# Patient Record
Sex: MALE | Race: WHITE | NOT HISPANIC OR LATINO | Employment: OTHER | URBAN - METROPOLITAN AREA
[De-identification: names, ages, dates, MRNs, and addresses within clinical notes are randomized per-mention and may not be internally consistent; named-entity substitution may affect disease eponyms.]

---

## 2018-04-18 ENCOUNTER — HOSPITAL ENCOUNTER (EMERGENCY)
Facility: HOSPITAL | Age: 53
Discharge: HOME | End: 2018-04-18
Attending: SURGERY
Payer: MEDICARE

## 2018-04-18 VITALS
OXYGEN SATURATION: 94 % | HEART RATE: 94 BPM | WEIGHT: 190 LBS | TEMPERATURE: 99 F | RESPIRATION RATE: 20 BRPM | SYSTOLIC BLOOD PRESSURE: 131 MMHG | DIASTOLIC BLOOD PRESSURE: 84 MMHG

## 2018-04-18 DIAGNOSIS — J20.9 SUBACUTE BRONCHITIS WITH ASTHMA: Primary | ICD-10-CM

## 2018-04-18 DIAGNOSIS — J45.909 SUBACUTE BRONCHITIS WITH ASTHMA: Primary | ICD-10-CM

## 2018-04-18 DIAGNOSIS — R06.02 SOB (SHORTNESS OF BREATH): ICD-10-CM

## 2018-04-18 PROCEDURE — 99284 EMERGENCY DEPT VISIT MOD MDM: CPT | Mod: 25

## 2018-04-18 PROCEDURE — 96361 HYDRATE IV INFUSION ADD-ON: CPT

## 2018-04-18 PROCEDURE — 94640 AIRWAY INHALATION TREATMENT: CPT

## 2018-04-18 PROCEDURE — 25000242 PHARM REV CODE 250 ALT 637 W/ HCPCS: Performed by: SURGERY

## 2018-04-18 PROCEDURE — 96374 THER/PROPH/DIAG INJ IV PUSH: CPT

## 2018-04-18 PROCEDURE — 63600175 PHARM REV CODE 636 W HCPCS: Performed by: SURGERY

## 2018-04-18 PROCEDURE — 25000003 PHARM REV CODE 250: Performed by: SURGERY

## 2018-04-18 RX ORDER — IPRATROPIUM BROMIDE AND ALBUTEROL SULFATE 2.5; .5 MG/3ML; MG/3ML
3 SOLUTION RESPIRATORY (INHALATION) ONCE
Status: COMPLETED | OUTPATIENT
Start: 2018-04-18 | End: 2018-04-18

## 2018-04-18 RX ORDER — LEVALBUTEROL 1.25 MG/.5ML
1.25 SOLUTION, CONCENTRATE RESPIRATORY (INHALATION)
Status: COMPLETED | OUTPATIENT
Start: 2018-04-18 | End: 2018-04-18

## 2018-04-18 RX ORDER — ALBUTEROL SULFATE 0.63 MG/3ML
0.63 SOLUTION RESPIRATORY (INHALATION) EVERY 6 HOURS PRN
Qty: 1 BOX | Refills: 5 | Status: SHIPPED | OUTPATIENT
Start: 2018-04-18

## 2018-04-18 RX ORDER — METHYLPREDNISOLONE SOD SUCC 125 MG
125 VIAL (EA) INJECTION
Status: COMPLETED | OUTPATIENT
Start: 2018-04-18 | End: 2018-04-18

## 2018-04-18 RX ORDER — HYDROCODONE BITARTRATE AND ACETAMINOPHEN 10; 325 MG/1; MG/1
1 TABLET ORAL
Status: COMPLETED | OUTPATIENT
Start: 2018-04-18 | End: 2018-04-18

## 2018-04-18 RX ORDER — HYDROCODONE BITARTRATE AND HOMATROPINE METHYLBROMIDE ORAL SOLUTION 5; 1.5 MG/5ML; MG/5ML
5 LIQUID ORAL EVERY 4 HOURS PRN
Qty: 120 ML | Refills: 0 | Status: ON HOLD | OUTPATIENT
Start: 2018-04-18 | End: 2021-12-25

## 2018-04-18 RX ORDER — AZITHROMYCIN 250 MG/1
250 TABLET, FILM COATED ORAL DAILY
Qty: 6 TABLET | Refills: 0 | Status: ON HOLD | OUTPATIENT
Start: 2018-04-18 | End: 2021-12-25

## 2018-04-18 RX ORDER — ALBUTEROL SULFATE 0.63 MG/3ML
0.63 SOLUTION RESPIRATORY (INHALATION) EVERY 6 HOURS PRN
COMMUNITY
End: 2018-04-18

## 2018-04-18 RX ADMIN — IPRATROPIUM BROMIDE AND ALBUTEROL SULFATE 3 ML: .5; 3 SOLUTION RESPIRATORY (INHALATION) at 12:04

## 2018-04-18 RX ADMIN — IPRATROPIUM BROMIDE AND ALBUTEROL SULFATE 3 ML: .5; 3 SOLUTION RESPIRATORY (INHALATION) at 01:04

## 2018-04-18 RX ADMIN — LEVALBUTEROL HYDROCHLORIDE 1.25 MG: 1.25 SOLUTION, CONCENTRATE RESPIRATORY (INHALATION) at 02:04

## 2018-04-18 RX ADMIN — HYDROCODONE BITARTRATE AND ACETAMINOPHEN 1 TABLET: 10; 325 TABLET ORAL at 01:04

## 2018-04-18 RX ADMIN — METHYLPREDNISOLONE SODIUM SUCCINATE 125 MG: 125 INJECTION, POWDER, FOR SOLUTION INTRAMUSCULAR; INTRAVENOUS at 12:04

## 2018-04-18 RX ADMIN — SODIUM CHLORIDE 1000 ML: 0.9 INJECTION, SOLUTION INTRAVENOUS at 12:04

## 2018-04-18 NOTE — ED NOTES
Pt is doing a lot of productive coughing. He verbalize his chest and back is hurting from all the coughing.

## 2018-04-18 NOTE — ED NOTES
Been SOB x 3 days was working in a house doing remodel and it got worst. RA sat is 91%.  Hx of COPD.

## 2018-04-18 NOTE — ED PROVIDER NOTES
Encounter Date: 4/18/2018       History     Chief Complaint   Patient presents with    Shortness of Breath     SOB that started 2 days. HX of COPD. takes albuterol at home     Patient has run out of his nebulizers and has had acute shortness of breath for the last several days he comes in and he cannot catch his breath in mild distress      The history is provided by the patient.   Shortness of Breath   This is a recurrent problem. The average episode lasts 2 hours. The problem occurs frequently.The current episode started 1 to 2 hours ago. The problem has been rapidly worsening. Associated symptoms include cough, sputum production and wheezing. Pertinent negatives include no fever and no chest pain. He has tried beta-agonist inhalers for the symptoms. The treatment provided mild relief. He has had no prior hospitalizations. He has had prior ED visits. He has had no prior ICU admissions. Associated medical issues include asthma.     Review of patient's allergies indicates:  No Known Allergies  Past Medical History:   Diagnosis Date    COPD (chronic obstructive pulmonary disease)      History reviewed. No pertinent surgical history.  History reviewed. No pertinent family history.  Social History   Substance Use Topics    Smoking status: Current Every Day Smoker     Packs/day: 1.00    Smokeless tobacco: Never Used    Alcohol use No     Review of Systems   Constitutional: Negative for fever.   Eyes: Negative.    Respiratory: Positive for cough, sputum production, shortness of breath, wheezing and stridor.    Cardiovascular: Negative for chest pain.   Gastrointestinal: Negative.    Endocrine: Negative.    Genitourinary: Negative.    Musculoskeletal: Negative.    Skin: Negative.    Allergic/Immunologic: Negative.    Neurological: Negative.    Hematological: Negative.    Psychiatric/Behavioral: Negative.        Physical Exam     Initial Vitals [04/18/18 1232]   BP Pulse Resp Temp SpO2   (!) 145/90 107 20 98.5 °F  (36.9 °C) (!) 92 %      MAP       108.33         Physical Exam    Vitals reviewed.  Constitutional: He appears well-developed and well-nourished. He appears distressed.   Moderate distress could hardly speak in full sentences   HENT:   Head: Normocephalic.   Eyes: Conjunctivae are normal.   Neck: Normal range of motion. Neck supple.   Cardiovascular:   Tachycardia   Pulmonary/Chest: He is in respiratory distress. He has wheezes.   Patient is titrated with little air exchange he is sweating and in moderate distress his lips are not cyanotic   Abdominal: Soft.   Musculoskeletal: Normal range of motion.   Neurological: He is alert and oriented to person, place, and time.   Skin: Skin is warm and dry.   Psychiatric: He has a normal mood and affect.         ED Course   Critical Care  Date/Time: 4/18/2018 2:31 PM  Performed by: VIKI MORALES III  Authorized by: VIKI MORALES III   Direct patient critical care time: 30 minutes  Ordering / reviewing critical care time: 10 minutes  Documentation critical care time: 20 minutes  Total critical care time (exclusive of procedural time) : 60 minutes        Labs Reviewed - No data to display          Medical Decision Making:   Initial Assessment:   Acute asthmatic exacerbation in moderate distress  ED Management:  Patient ran out of his nebulizers and comes in in moderate distress.  He cannot move any air.  He is not cyanotic but is using inspiratory muscles to breathe.  He was started on a uni heart treatments duo nebs×3 with mild to moderate relief  his chest x-ray is clear and  he was given IV steroids.  He is given IV fluids he was given additional dose of Xopenex at the time of discharge is room air O2 sats were 98% and he was still coughing but his wheezing is much improved                      Clinical Impression:   The primary encounter diagnosis was Subacute bronchitis with asthma. A diagnosis of SOB (shortness of breath) was also pertinent to this  visit.    Disposition:   Disposition: Discharged  Condition: Stable                        VIKI Tubbs III, MD  04/18/18 4330

## 2018-12-03 ENCOUNTER — TELEPHONE (OUTPATIENT)
Dept: FAMILY MEDICINE | Facility: CLINIC | Age: 53
End: 2018-12-03

## 2018-12-18 ENCOUNTER — TELEPHONE (OUTPATIENT)
Dept: FAMILY MEDICINE | Facility: CLINIC | Age: 53
End: 2018-12-18

## 2018-12-18 NOTE — TELEPHONE ENCOUNTER
----- Message from Yamel Elaine sent at 12/18/2018  8:38 AM CST -----  Contact: [phone]/ self   Pt its requesting an appointment for today . Please advise        Offer dr walker, per dr arboleda! AR

## 2020-12-08 ENCOUNTER — TELEPHONE (OUTPATIENT)
Dept: FAMILY MEDICINE | Facility: CLINIC | Age: 55
End: 2020-12-08

## 2020-12-08 NOTE — TELEPHONE ENCOUNTER
I spoke with cheryl and scheduled the pt to see dr hassan dec 15 - [place]  The pt has medicare and medicaid ( secondary)

## 2020-12-08 NOTE — TELEPHONE ENCOUNTER
----- Message from Skylar Jim sent at 12/8/2020  8:07 AM CST -----  Regarding: NP to establish care  Contact: germain Tiwari/[phone]  Patient is requesting a call back regarding establishing care. Please call him to schedule an appt for knee pain. Thanks   Would the patient rather a call back or a response via [facility]?  call  Best Call Back Number:  germain Tiwari/[phone]  Additional Information: Only Dr. Soriano or Dr. Becerra

## 2021-11-19 ENCOUNTER — HOSPITAL ENCOUNTER (EMERGENCY)
Facility: HOSPITAL | Age: 56
Discharge: HOME | End: 2021-11-19
Attending: EMERGENCY MEDICINE
Payer: MEDICARE

## 2021-11-19 VITALS
WEIGHT: 220 LBS | HEART RATE: 90 BPM | SYSTOLIC BLOOD PRESSURE: 176 MMHG | DIASTOLIC BLOOD PRESSURE: 85 MMHG | TEMPERATURE: 98 F | OXYGEN SATURATION: 99 % | BODY MASS INDEX: 28.23 KG/M2 | RESPIRATION RATE: 18 BRPM | HEIGHT: 74 IN

## 2021-11-19 DIAGNOSIS — F22 PARANOID PSYCHOSIS: Primary | ICD-10-CM

## 2021-11-19 PROCEDURE — 99283 EMERGENCY DEPT VISIT LOW MDM: CPT | Mod: ER

## 2021-11-19 RX ORDER — ALBENDAZOLE 200 MG/1
400 TABLET, FILM COATED ORAL 2 TIMES DAILY WITH MEALS
Qty: 6 TABLET | Refills: 0 | Status: SHIPPED | OUTPATIENT
Start: 2021-11-19 | End: 2021-11-22

## 2021-12-24 ENCOUNTER — HOSPITAL ENCOUNTER (INPATIENT)
Facility: HOSPITAL | Age: 56
LOS: 3 days | Discharge: LEFT BEFORE DISCHARGE | DRG: 378 | End: 2021-12-28
Attending: EMERGENCY MEDICINE | Admitting: FAMILY MEDICINE
Payer: MEDICARE

## 2021-12-24 DIAGNOSIS — F15.11 HISTORY OF METHAMPHETAMINE ABUSE: ICD-10-CM

## 2021-12-24 DIAGNOSIS — K92.2 GI BLEED: Primary | ICD-10-CM

## 2021-12-24 DIAGNOSIS — R41.82 ALTERED MENTAL STATUS, UNSPECIFIED ALTERED MENTAL STATUS TYPE: ICD-10-CM

## 2021-12-24 DIAGNOSIS — K92.2 LOWER GI BLEED: ICD-10-CM

## 2021-12-24 DIAGNOSIS — B18.2 CHRONIC HEPATITIS C WITHOUT HEPATIC COMA: ICD-10-CM

## 2021-12-24 DIAGNOSIS — K26.4 DUODENAL ULCER WITH HEMORRHAGE: ICD-10-CM

## 2021-12-24 DIAGNOSIS — R10.9 ABDOMINAL PAIN, UNSPECIFIED ABDOMINAL LOCATION: ICD-10-CM

## 2021-12-24 DIAGNOSIS — K92.2 GASTROINTESTINAL HEMORRHAGE, UNSPECIFIED GASTROINTESTINAL HEMORRHAGE TYPE: ICD-10-CM

## 2021-12-24 DIAGNOSIS — F22 EKBOM'S DELUSIONAL PARASITOSIS: ICD-10-CM

## 2021-12-24 DIAGNOSIS — R55 SYNCOPE, UNSPECIFIED SYNCOPE TYPE: ICD-10-CM

## 2021-12-24 DIAGNOSIS — J96.00 ACUTE RESPIRATORY FAILURE, UNSPECIFIED WHETHER WITH HYPOXIA OR HYPERCAPNIA: ICD-10-CM

## 2021-12-24 DIAGNOSIS — K27.9 PUD (PEPTIC ULCER DISEASE): ICD-10-CM

## 2021-12-24 DIAGNOSIS — F31.9 BIPOLAR 1 DISORDER: ICD-10-CM

## 2021-12-24 DIAGNOSIS — T40.1X1S: ICD-10-CM

## 2021-12-24 DIAGNOSIS — A41.9 SEPSIS: ICD-10-CM

## 2021-12-24 DIAGNOSIS — K62.5 RECTAL BLEEDING: ICD-10-CM

## 2021-12-24 DIAGNOSIS — F19.90 IVDU (INTRAVENOUS DRUG USER): ICD-10-CM

## 2021-12-24 LAB
ALBUMIN SERPL BCP-MCNC: 3.3 G/DL (ref 3.5–5.2)
ALP SERPL-CCNC: 88 U/L (ref 38–126)
ALT SERPL W/O P-5'-P-CCNC: 18 U/L (ref 10–44)
ANION GAP SERPL CALC-SCNC: 9 MMOL/L (ref 8–16)
AST SERPL-CCNC: 18 U/L (ref 15–46)
BASOPHILS # BLD AUTO: 0.07 K/UL (ref 0–0.2)
BASOPHILS NFR BLD: 0.4 % (ref 0–1.9)
BILIRUB SERPL-MCNC: 0.2 MG/DL (ref 0.1–1)
CALCIUM SERPL-MCNC: 8.3 MG/DL (ref 8.7–10.5)
CHLORIDE SERPL-SCNC: 107 MMOL/L (ref 95–110)
CO2 SERPL-SCNC: 22 MMOL/L (ref 23–29)
CREAT SERPL-MCNC: 0.75 MG/DL (ref 0.5–1.4)
DIFFERENTIAL METHOD: ABNORMAL
EOSINOPHIL # BLD AUTO: 0.2 K/UL (ref 0–0.5)
EOSINOPHIL NFR BLD: 1 % (ref 0–8)
ERYTHROCYTE [DISTWIDTH] IN BLOOD BY AUTOMATED COUNT: 14.3 % (ref 11.5–14.5)
EST. GFR  (AFRICAN AMERICAN): >60 ML/MIN/1.73 M^2
EST. GFR  (NON AFRICAN AMERICAN): >60 ML/MIN/1.73 M^2
GLUCOSE SERPL-MCNC: 154 MG/DL (ref 70–110)
HCT VFR BLD AUTO: 28.5 % (ref 40–54)
HGB BLD-MCNC: 9 G/DL (ref 14–18)
IMM GRANULOCYTES # BLD AUTO: 0.12 K/UL (ref 0–0.04)
IMM GRANULOCYTES NFR BLD AUTO: 0.6 % (ref 0–0.5)
INR PPP: 1.1 (ref 0.8–1.2)
LYMPHOCYTES # BLD AUTO: 3 K/UL (ref 1–4.8)
LYMPHOCYTES NFR BLD: 15.8 % (ref 18–48)
MCH RBC QN AUTO: 26.9 PG (ref 27–31)
MCHC RBC AUTO-ENTMCNC: 31.6 G/DL (ref 32–36)
MCV RBC AUTO: 85 FL (ref 82–98)
MONOCYTES # BLD AUTO: 1.1 K/UL (ref 0.3–1)
MONOCYTES NFR BLD: 5.8 % (ref 4–15)
NEUTROPHILS # BLD AUTO: 14.3 K/UL (ref 1.8–7.7)
NEUTROPHILS NFR BLD: 76.4 % (ref 38–73)
NRBC BLD-RTO: 0 /100 WBC
PLATELET # BLD AUTO: 309 K/UL (ref 150–450)
PMV BLD AUTO: 11.7 FL (ref 9.2–12.9)
POTASSIUM SERPL-SCNC: 4.3 MMOL/L (ref 3.5–5.1)
PROT SERPL-MCNC: 6.1 G/DL (ref 6–8.4)
PROTHROMBIN TIME: 11.4 SEC (ref 9–12.5)
RBC # BLD AUTO: 3.34 M/UL (ref 4.6–6.2)
SODIUM SERPL-SCNC: 138 MMOL/L (ref 136–145)
UUN UR-MCNC: 40 MG/DL (ref 2–20)
WBC # BLD AUTO: 18.69 K/UL (ref 3.9–12.7)

## 2021-12-24 PROCEDURE — 63600175 PHARM REV CODE 636 W HCPCS: Mod: ER | Performed by: EMERGENCY MEDICINE

## 2021-12-24 PROCEDURE — 96374 THER/PROPH/DIAG INJ IV PUSH: CPT | Mod: ER

## 2021-12-24 PROCEDURE — 25000003 PHARM REV CODE 250: Mod: ER | Performed by: EMERGENCY MEDICINE

## 2021-12-24 PROCEDURE — 85610 PROTHROMBIN TIME: CPT | Mod: ER | Performed by: EMERGENCY MEDICINE

## 2021-12-24 PROCEDURE — 99285 EMERGENCY DEPT VISIT HI MDM: CPT | Mod: 25,ER

## 2021-12-24 PROCEDURE — 85025 COMPLETE CBC W/AUTO DIFF WBC: CPT | Mod: ER | Performed by: EMERGENCY MEDICINE

## 2021-12-24 PROCEDURE — 80053 COMPREHEN METABOLIC PANEL: CPT | Mod: ER | Performed by: EMERGENCY MEDICINE

## 2021-12-24 PROCEDURE — 96375 TX/PRO/DX INJ NEW DRUG ADDON: CPT | Mod: ER

## 2021-12-24 PROCEDURE — C9113 INJ PANTOPRAZOLE SODIUM, VIA: HCPCS | Mod: ER | Performed by: EMERGENCY MEDICINE

## 2021-12-24 RX ORDER — PANTOPRAZOLE SODIUM 40 MG/10ML
40 INJECTION, POWDER, LYOPHILIZED, FOR SOLUTION INTRAVENOUS
Status: COMPLETED | OUTPATIENT
Start: 2021-12-24 | End: 2021-12-24

## 2021-12-24 RX ADMIN — SODIUM CHLORIDE 1000 ML: 0.9 INJECTION, SOLUTION INTRAVENOUS at 11:12

## 2021-12-24 RX ADMIN — PANTOPRAZOLE SODIUM 40 MG: 40 INJECTION, POWDER, LYOPHILIZED, FOR SOLUTION INTRAVENOUS at 11:12

## 2021-12-25 ENCOUNTER — ANESTHESIA EVENT (OUTPATIENT)
Dept: ENDOSCOPY | Facility: HOSPITAL | Age: 56
DRG: 378 | End: 2021-12-25
Payer: MEDICARE

## 2021-12-25 ENCOUNTER — ANESTHESIA (OUTPATIENT)
Dept: ENDOSCOPY | Facility: HOSPITAL | Age: 56
DRG: 378 | End: 2021-12-25
Payer: MEDICARE

## 2021-12-25 PROBLEM — K92.2 GI BLEED: Status: ACTIVE | Noted: 2021-12-25

## 2021-12-25 PROBLEM — D62 ACUTE BLOOD LOSS ANEMIA: Status: ACTIVE | Noted: 2021-12-25

## 2021-12-25 PROBLEM — B18.2 CHRONIC HEPATITIS C: Status: ACTIVE | Noted: 2021-12-25

## 2021-12-25 PROBLEM — F15.11 HISTORY OF METHAMPHETAMINE ABUSE: Status: ACTIVE | Noted: 2021-12-25

## 2021-12-25 PROBLEM — R55 SYNCOPE: Status: ACTIVE | Noted: 2021-12-25

## 2021-12-25 PROBLEM — F31.9 BIPOLAR 1 DISORDER: Status: ACTIVE | Noted: 2021-12-25

## 2021-12-25 PROBLEM — F19.90 IVDU (INTRAVENOUS DRUG USER): Status: ACTIVE | Noted: 2021-12-25

## 2021-12-25 LAB
ABO + RH BLD: NORMAL
ALBUMIN SERPL BCP-MCNC: 3.1 G/DL (ref 3.5–5.2)
ALBUMIN SERPL BCP-MCNC: 3.3 G/DL (ref 3.5–5.2)
ALLENS TEST: ABNORMAL
ALP SERPL-CCNC: 74 U/L (ref 55–135)
ALP SERPL-CCNC: 79 U/L (ref 55–135)
ALT SERPL W/O P-5'-P-CCNC: 14 U/L (ref 10–44)
ALT SERPL W/O P-5'-P-CCNC: 17 U/L (ref 10–44)
AMPHET+METHAMPHET UR QL: ABNORMAL
ANION GAP SERPL CALC-SCNC: 11 MMOL/L (ref 8–16)
ANION GAP SERPL CALC-SCNC: 8 MMOL/L (ref 8–16)
AORTIC ROOT ANNULUS: 3.4 CM
AST SERPL-CCNC: 13 U/L (ref 10–40)
AST SERPL-CCNC: 14 U/L (ref 10–40)
AV INDEX (PROSTH): 0.82
AV MEAN GRADIENT: 5 MMHG
AV PEAK GRADIENT: 8 MMHG
AV VALVE AREA: 3.42 CM2
AV VELOCITY RATIO: 0.84
BARBITURATES UR QL SCN>200 NG/ML: NEGATIVE
BASOPHILS # BLD AUTO: 0.05 K/UL (ref 0–0.2)
BASOPHILS # BLD AUTO: 0.05 K/UL (ref 0–0.2)
BASOPHILS NFR BLD: 0.3 % (ref 0–1.9)
BASOPHILS NFR BLD: 0.3 % (ref 0–1.9)
BENZODIAZ UR QL SCN>200 NG/ML: NEGATIVE
BILIRUB SERPL-MCNC: 0.2 MG/DL (ref 0.1–1)
BILIRUB SERPL-MCNC: 0.3 MG/DL (ref 0.1–1)
BLD GP AB SCN CELLS X3 SERPL QL: NORMAL
BSA FOR ECHO PROCEDURE: 2.21 M2
BUN SERPL-MCNC: 22 MG/DL (ref 6–20)
BUN SERPL-MCNC: 33 MG/DL (ref 6–20)
BZE UR QL SCN: NEGATIVE
CALCIUM SERPL-MCNC: 8.3 MG/DL (ref 8.7–10.5)
CALCIUM SERPL-MCNC: 8.5 MG/DL (ref 8.7–10.5)
CANNABINOIDS UR QL SCN: NEGATIVE
CHLORIDE SERPL-SCNC: 107 MMOL/L (ref 95–110)
CHLORIDE SERPL-SCNC: 108 MMOL/L (ref 95–110)
CK SERPL-CCNC: 51 U/L (ref 20–200)
CO2 SERPL-SCNC: 19 MMOL/L (ref 23–29)
CO2 SERPL-SCNC: 21 MMOL/L (ref 23–29)
CREAT SERPL-MCNC: 0.7 MG/DL (ref 0.5–1.4)
CREAT SERPL-MCNC: 0.8 MG/DL (ref 0.5–1.4)
CREAT UR-MCNC: 81.1 MG/DL (ref 23–375)
CTP QC/QA: YES
CV ECHO LV RWT: 0.4 CM
DELSYS: ABNORMAL
DIFFERENTIAL METHOD: ABNORMAL
DIFFERENTIAL METHOD: ABNORMAL
DOP CALC AO PEAK VEL: 1.43 M/S
DOP CALC AO VTI: 25.78 CM
DOP CALC LVOT AREA: 4.2 CM2
DOP CALC LVOT DIAMETER: 2.3 CM
DOP CALC LVOT PEAK VEL: 1.2 M/S
DOP CALC LVOT STROKE VOLUME: 88.24 CM3
DOP CALC MV VTI: 18.05 CM
DOP CALCLVOT PEAK VEL VTI: 21.25 CM
E WAVE DECELERATION TIME: 254.52 MSEC
E/A RATIO: 1
E/E' RATIO: 5.67 M/S
ECHO LV POSTERIOR WALL: 1 CM (ref 0.6–1.1)
EJECTION FRACTION: 65 %
EOSINOPHIL # BLD AUTO: 0 K/UL (ref 0–0.5)
EOSINOPHIL # BLD AUTO: 0.1 K/UL (ref 0–0.5)
EOSINOPHIL NFR BLD: 0.1 % (ref 0–8)
EOSINOPHIL NFR BLD: 0.6 % (ref 0–8)
ERYTHROCYTE [DISTWIDTH] IN BLOOD BY AUTOMATED COUNT: 14.4 % (ref 11.5–14.5)
ERYTHROCYTE [DISTWIDTH] IN BLOOD BY AUTOMATED COUNT: 14.5 % (ref 11.5–14.5)
ERYTHROCYTE [SEDIMENTATION RATE] IN BLOOD BY WESTERGREN METHOD: 22 MM/H
EST. GFR  (AFRICAN AMERICAN): >60 ML/MIN/1.73 M^2
EST. GFR  (AFRICAN AMERICAN): >60 ML/MIN/1.73 M^2
EST. GFR  (NON AFRICAN AMERICAN): >60 ML/MIN/1.73 M^2
EST. GFR  (NON AFRICAN AMERICAN): >60 ML/MIN/1.73 M^2
ESTIMATED AVG GLUCOSE: 117 MG/DL (ref 68–131)
ETHANOL SERPL-MCNC: <10 MG/DL
FERRITIN SERPL-MCNC: 32 NG/ML (ref 20–300)
FIO2: 40
FOLATE SERPL-MCNC: 7.4 NG/ML (ref 4–24)
FRACTIONAL SHORTENING: 36 % (ref 28–44)
GLUCOSE SERPL-MCNC: 121 MG/DL (ref 70–110)
GLUCOSE SERPL-MCNC: 156 MG/DL (ref 70–110)
HBA1C MFR BLD: 5.7 % (ref 4–5.6)
HCO3 UR-SCNC: 23.7 MMOL/L (ref 24–28)
HCT VFR BLD AUTO: 21.1 % (ref 40–54)
HCT VFR BLD AUTO: 22.6 % (ref 40–54)
HCT VFR BLD AUTO: 22.7 % (ref 40–54)
HCT VFR BLD AUTO: 22.9 % (ref 40–54)
HCT VFR BLD AUTO: 23.2 % (ref 40–54)
HCT VFR BLD AUTO: 26.1 % (ref 40–54)
HCT VFR BLD CALC: 22 %PCV (ref 36–54)
HGB BLD-MCNC: 6.9 G/DL (ref 14–18)
HGB BLD-MCNC: 7.4 G/DL (ref 14–18)
HGB BLD-MCNC: 7.5 G/DL (ref 14–18)
HGB BLD-MCNC: 8 G/DL
HGB BLD-MCNC: 8.4 G/DL (ref 14–18)
IMM GRANULOCYTES # BLD AUTO: 0.06 K/UL (ref 0–0.04)
IMM GRANULOCYTES # BLD AUTO: 0.15 K/UL (ref 0–0.04)
IMM GRANULOCYTES NFR BLD AUTO: 0.4 % (ref 0–0.5)
IMM GRANULOCYTES NFR BLD AUTO: 0.8 % (ref 0–0.5)
INTERVENTRICULAR SEPTUM: 1.13 CM (ref 0.6–1.1)
IRON SERPL-MCNC: 39 UG/DL (ref 45–160)
LA MAJOR: 4.44 CM
LA MINOR: 4.68 CM
LA WIDTH: 3.78 CM
LACTATE SERPL-SCNC: 1.3 MMOL/L (ref 0.5–2.2)
LACTATE SERPL-SCNC: 1.5 MMOL/L (ref 0.5–2.2)
LEFT ATRIUM SIZE: 2.96 CM
LEFT ATRIUM VOLUME INDEX MOD: 15.2 ML/M2
LEFT ATRIUM VOLUME INDEX: 19.7 ML/M2
LEFT ATRIUM VOLUME MOD: 33.48 CM3
LEFT ATRIUM VOLUME: 43.34 CM3
LEFT INTERNAL DIMENSION IN SYSTOLE: 3.2 CM (ref 2.1–4)
LEFT VENTRICLE DIASTOLIC VOLUME INDEX: 54.56 ML/M2
LEFT VENTRICLE DIASTOLIC VOLUME: 120.04 ML
LEFT VENTRICLE MASS INDEX: 91 G/M2
LEFT VENTRICLE SYSTOLIC VOLUME INDEX: 18.7 ML/M2
LEFT VENTRICLE SYSTOLIC VOLUME: 41.05 ML
LEFT VENTRICULAR INTERNAL DIMENSION IN DIASTOLE: 5.03 CM (ref 3.5–6)
LEFT VENTRICULAR MASS: 200.11 G
LIPASE SERPL-CCNC: 13 U/L (ref 4–60)
LV LATERAL E/E' RATIO: 4.86 M/S
LV SEPTAL E/E' RATIO: 6.8 M/S
LYMPHOCYTES # BLD AUTO: 2.9 K/UL (ref 1–4.8)
LYMPHOCYTES # BLD AUTO: 3.1 K/UL (ref 1–4.8)
LYMPHOCYTES NFR BLD: 15.7 % (ref 18–48)
LYMPHOCYTES NFR BLD: 21.8 % (ref 18–48)
MAGNESIUM SERPL-MCNC: 1.8 MG/DL (ref 1.6–2.6)
MAGNESIUM SERPL-MCNC: 2 MG/DL (ref 1.6–2.6)
MCH RBC QN AUTO: 26.6 PG (ref 27–31)
MCH RBC QN AUTO: 26.9 PG (ref 27–31)
MCHC RBC AUTO-ENTMCNC: 32.2 G/DL (ref 32–36)
MCHC RBC AUTO-ENTMCNC: 32.3 G/DL (ref 32–36)
MCV RBC AUTO: 82 FL (ref 82–98)
MCV RBC AUTO: 84 FL (ref 82–98)
METHADONE UR QL SCN>300 NG/ML: NEGATIVE
MIN VOL: 13
MODE: ABNORMAL
MONOCYTES # BLD AUTO: 0.8 K/UL (ref 0.3–1)
MONOCYTES # BLD AUTO: 0.8 K/UL (ref 0.3–1)
MONOCYTES NFR BLD: 4.2 % (ref 4–15)
MONOCYTES NFR BLD: 5.3 % (ref 4–15)
MV MEAN GRADIENT: 0 MMHG
MV PEAK A VEL: 0.68 M/S
MV PEAK E VEL: 0.68 M/S
MV PEAK GRADIENT: 2 MMHG
MV STENOSIS PRESSURE HALF TIME: 73.81 MS
MV VALVE AREA BY CONTINUITY EQUATION: 4.89 CM2
MV VALVE AREA P 1/2 METHOD: 2.98 CM2
NEUTROPHILS # BLD AUTO: 10.3 K/UL (ref 1.8–7.7)
NEUTROPHILS # BLD AUTO: 14.4 K/UL (ref 1.8–7.7)
NEUTROPHILS NFR BLD: 71.6 % (ref 38–73)
NEUTROPHILS NFR BLD: 78.9 % (ref 38–73)
NRBC BLD-RTO: 0 /100 WBC
NRBC BLD-RTO: 0 /100 WBC
OB PNL STL: POSITIVE
OPIATES UR QL SCN: ABNORMAL
PCO2 BLDA: 35.7 MMHG (ref 35–45)
PCP UR QL SCN>25 NG/ML: NEGATIVE
PEEP: 5
PH SMN: 7.43 [PH] (ref 7.35–7.45)
PHOSPHATE SERPL-MCNC: 3.2 MG/DL (ref 2.7–4.5)
PHOSPHATE SERPL-MCNC: 3.4 MG/DL (ref 2.7–4.5)
PIP: 16
PLATELET # BLD AUTO: 280 K/UL (ref 150–450)
PLATELET # BLD AUTO: 289 K/UL (ref 150–450)
PMV BLD AUTO: 11.3 FL (ref 9.2–12.9)
PMV BLD AUTO: 11.7 FL (ref 9.2–12.9)
PO2 BLDA: 138 MMHG (ref 80–100)
POC BE: -1 MMOL/L
POC SATURATED O2: 99 % (ref 95–100)
POC TCO2: 25 MMOL/L (ref 23–27)
POTASSIUM BLD-SCNC: 3.8 MMOL/L (ref 3.5–5.1)
POTASSIUM SERPL-SCNC: 4.2 MMOL/L (ref 3.5–5.1)
POTASSIUM SERPL-SCNC: 4.4 MMOL/L (ref 3.5–5.1)
PROT SERPL-MCNC: 5.7 G/DL (ref 6–8.4)
PROT SERPL-MCNC: 5.8 G/DL (ref 6–8.4)
PV PEAK VELOCITY: 0.91 CM/S
RA MAJOR: 4.5 CM
RA PRESSURE: 3 MMHG
RA WIDTH: 3.45 CM
RBC # BLD AUTO: 2.82 M/UL (ref 4.6–6.2)
RBC # BLD AUTO: 3.12 M/UL (ref 4.6–6.2)
RIGHT VENTRICULAR END-DIASTOLIC DIMENSION: 3.05 CM
RV TISSUE DOPPLER FREE WALL SYSTOLIC VELOCITY 1 (APICAL 4 CHAMBER VIEW): 16.03 CM/S
SAMPLE: ABNORMAL
SARS-COV-2 RDRP RESP QL NAA+PROBE: NEGATIVE
SATURATED IRON: 12 % (ref 20–50)
SITE: ABNORMAL
SODIUM BLD-SCNC: 137 MMOL/L (ref 136–145)
SODIUM SERPL-SCNC: 137 MMOL/L (ref 136–145)
SODIUM SERPL-SCNC: 137 MMOL/L (ref 136–145)
SP02: 100
TDI LATERAL: 0.14 M/S
TDI SEPTAL: 0.1 M/S
TDI: 0.12 M/S
TOTAL IRON BINDING CAPACITY: 323 UG/DL (ref 250–450)
TOXICOLOGY INFORMATION: ABNORMAL
TRANSFERRIN SERPL-MCNC: 218 MG/DL (ref 200–375)
TROPONIN I SERPL DL<=0.01 NG/ML-MCNC: 0.04 NG/ML (ref 0–0.03)
TROPONIN I SERPL DL<=0.01 NG/ML-MCNC: 0.05 NG/ML (ref 0–0.03)
TSH SERPL DL<=0.005 MIU/L-ACNC: 0.43 UIU/ML (ref 0.4–4)
VIT B12 SERPL-MCNC: 432 PG/ML (ref 210–950)
VT: 480
WBC # BLD AUTO: 14.39 K/UL (ref 3.9–12.7)
WBC # BLD AUTO: 18.3 K/UL (ref 3.9–12.7)

## 2021-12-25 PROCEDURE — 87209 SMEAR COMPLEX STAIN: CPT | Performed by: STUDENT IN AN ORGANIZED HEALTH CARE EDUCATION/TRAINING PROGRAM

## 2021-12-25 PROCEDURE — 20000000 HC ICU ROOM

## 2021-12-25 PROCEDURE — 37000008 HC ANESTHESIA 1ST 15 MINUTES: Performed by: INTERNAL MEDICINE

## 2021-12-25 PROCEDURE — 99900035 HC TECH TIME PER 15 MIN (STAT)

## 2021-12-25 PROCEDURE — 27201038 HC PROBE, BI-POLAR: Performed by: INTERNAL MEDICINE

## 2021-12-25 PROCEDURE — C9113 INJ PANTOPRAZOLE SODIUM, VIA: HCPCS | Performed by: STUDENT IN AN ORGANIZED HEALTH CARE EDUCATION/TRAINING PROGRAM

## 2021-12-25 PROCEDURE — 94002 VENT MGMT INPAT INIT DAY: CPT

## 2021-12-25 PROCEDURE — 80307 DRUG TEST PRSMV CHEM ANLYZR: CPT | Mod: ER | Performed by: EMERGENCY MEDICINE

## 2021-12-25 PROCEDURE — 86920 COMPATIBILITY TEST SPIN: CPT | Performed by: STUDENT IN AN ORGANIZED HEALTH CARE EDUCATION/TRAINING PROGRAM

## 2021-12-25 PROCEDURE — 63600175 PHARM REV CODE 636 W HCPCS: Performed by: INTERNAL MEDICINE

## 2021-12-25 PROCEDURE — 27202363 HC INJECTION AGENT, SUBMUCOSAL, ANY: Performed by: INTERNAL MEDICINE

## 2021-12-25 PROCEDURE — 83605 ASSAY OF LACTIC ACID: CPT | Mod: 91 | Performed by: STUDENT IN AN ORGANIZED HEALTH CARE EDUCATION/TRAINING PROGRAM

## 2021-12-25 PROCEDURE — 83690 ASSAY OF LIPASE: CPT | Performed by: STUDENT IN AN ORGANIZED HEALTH CARE EDUCATION/TRAINING PROGRAM

## 2021-12-25 PROCEDURE — 82803 BLOOD GASES ANY COMBINATION: CPT

## 2021-12-25 PROCEDURE — 25000003 PHARM REV CODE 250: Performed by: STUDENT IN AN ORGANIZED HEALTH CARE EDUCATION/TRAINING PROGRAM

## 2021-12-25 PROCEDURE — 80053 COMPREHEN METABOLIC PANEL: CPT | Mod: 91 | Performed by: STUDENT IN AN ORGANIZED HEALTH CARE EDUCATION/TRAINING PROGRAM

## 2021-12-25 PROCEDURE — 84100 ASSAY OF PHOSPHORUS: CPT | Performed by: STUDENT IN AN ORGANIZED HEALTH CARE EDUCATION/TRAINING PROGRAM

## 2021-12-25 PROCEDURE — 36415 COLL VENOUS BLD VENIPUNCTURE: CPT | Performed by: STUDENT IN AN ORGANIZED HEALTH CARE EDUCATION/TRAINING PROGRAM

## 2021-12-25 PROCEDURE — 83540 ASSAY OF IRON: CPT | Performed by: STUDENT IN AN ORGANIZED HEALTH CARE EDUCATION/TRAINING PROGRAM

## 2021-12-25 PROCEDURE — 83735 ASSAY OF MAGNESIUM: CPT | Mod: 91 | Performed by: STUDENT IN AN ORGANIZED HEALTH CARE EDUCATION/TRAINING PROGRAM

## 2021-12-25 PROCEDURE — 82272 OCCULT BLD FECES 1-3 TESTS: CPT | Performed by: STUDENT IN AN ORGANIZED HEALTH CARE EDUCATION/TRAINING PROGRAM

## 2021-12-25 PROCEDURE — 85025 COMPLETE CBC W/AUTO DIFF WBC: CPT | Performed by: STUDENT IN AN ORGANIZED HEALTH CARE EDUCATION/TRAINING PROGRAM

## 2021-12-25 PROCEDURE — 31500 INSERT EMERGENCY AIRWAY: CPT | Mod: GC,,, | Performed by: INTERNAL MEDICINE

## 2021-12-25 PROCEDURE — 84443 ASSAY THYROID STIM HORMONE: CPT | Performed by: STUDENT IN AN ORGANIZED HEALTH CARE EDUCATION/TRAINING PROGRAM

## 2021-12-25 PROCEDURE — 87040 BLOOD CULTURE FOR BACTERIA: CPT | Performed by: STUDENT IN AN ORGANIZED HEALTH CARE EDUCATION/TRAINING PROGRAM

## 2021-12-25 PROCEDURE — 82077 ASSAY SPEC XCP UR&BREATH IA: CPT | Performed by: STUDENT IN AN ORGANIZED HEALTH CARE EDUCATION/TRAINING PROGRAM

## 2021-12-25 PROCEDURE — 80074 ACUTE HEPATITIS PANEL: CPT | Performed by: STUDENT IN AN ORGANIZED HEALTH CARE EDUCATION/TRAINING PROGRAM

## 2021-12-25 PROCEDURE — 25000003 PHARM REV CODE 250

## 2021-12-25 PROCEDURE — 63600175 PHARM REV CODE 636 W HCPCS: Performed by: STUDENT IN AN ORGANIZED HEALTH CARE EDUCATION/TRAINING PROGRAM

## 2021-12-25 PROCEDURE — 82607 VITAMIN B-12: CPT | Performed by: STUDENT IN AN ORGANIZED HEALTH CARE EDUCATION/TRAINING PROGRAM

## 2021-12-25 PROCEDURE — 99291 CRITICAL CARE FIRST HOUR: CPT | Mod: 25,GC,, | Performed by: INTERNAL MEDICINE

## 2021-12-25 PROCEDURE — 36600 WITHDRAWAL OF ARTERIAL BLOOD: CPT

## 2021-12-25 PROCEDURE — 36415 COLL VENOUS BLD VENIPUNCTURE: CPT | Performed by: FAMILY MEDICINE

## 2021-12-25 PROCEDURE — 43236 UPPR GI SCOPE W/SUBMUC INJ: CPT | Performed by: INTERNAL MEDICINE

## 2021-12-25 PROCEDURE — 85018 HEMOGLOBIN: CPT | Mod: 91 | Performed by: STUDENT IN AN ORGANIZED HEALTH CARE EDUCATION/TRAINING PROGRAM

## 2021-12-25 PROCEDURE — 86901 BLOOD TYPING SEROLOGIC RH(D): CPT | Performed by: STUDENT IN AN ORGANIZED HEALTH CARE EDUCATION/TRAINING PROGRAM

## 2021-12-25 PROCEDURE — U0002 COVID-19 LAB TEST NON-CDC: HCPCS | Mod: ER | Performed by: EMERGENCY MEDICINE

## 2021-12-25 PROCEDURE — 84484 ASSAY OF TROPONIN QUANT: CPT | Mod: 91 | Performed by: STUDENT IN AN ORGANIZED HEALTH CARE EDUCATION/TRAINING PROGRAM

## 2021-12-25 PROCEDURE — 85014 HEMATOCRIT: CPT | Mod: 91 | Performed by: STUDENT IN AN ORGANIZED HEALTH CARE EDUCATION/TRAINING PROGRAM

## 2021-12-25 PROCEDURE — 63600175 PHARM REV CODE 636 W HCPCS: Performed by: FAMILY MEDICINE

## 2021-12-25 PROCEDURE — 27000221 HC OXYGEN, UP TO 24 HOURS

## 2021-12-25 PROCEDURE — 83036 HEMOGLOBIN GLYCOSYLATED A1C: CPT | Performed by: STUDENT IN AN ORGANIZED HEALTH CARE EDUCATION/TRAINING PROGRAM

## 2021-12-25 PROCEDURE — 85025 COMPLETE CBC W/AUTO DIFF WBC: CPT | Mod: 91 | Performed by: STUDENT IN AN ORGANIZED HEALTH CARE EDUCATION/TRAINING PROGRAM

## 2021-12-25 PROCEDURE — 82746 ASSAY OF FOLIC ACID SERUM: CPT | Performed by: STUDENT IN AN ORGANIZED HEALTH CARE EDUCATION/TRAINING PROGRAM

## 2021-12-25 PROCEDURE — 87522 HEPATITIS C REVRS TRNSCRPJ: CPT | Performed by: STUDENT IN AN ORGANIZED HEALTH CARE EDUCATION/TRAINING PROGRAM

## 2021-12-25 PROCEDURE — 63600175 PHARM REV CODE 636 W HCPCS

## 2021-12-25 PROCEDURE — 99291 PR CRITICAL CARE, E/M 30-74 MINUTES: ICD-10-PCS | Mod: 25,GC,, | Performed by: INTERNAL MEDICINE

## 2021-12-25 PROCEDURE — 83605 ASSAY OF LACTIC ACID: CPT | Performed by: STUDENT IN AN ORGANIZED HEALTH CARE EDUCATION/TRAINING PROGRAM

## 2021-12-25 PROCEDURE — 43255 EGD CONTROL BLEEDING ANY: CPT | Performed by: INTERNAL MEDICINE

## 2021-12-25 PROCEDURE — 37000009 HC ANESTHESIA EA ADD 15 MINS: Performed by: INTERNAL MEDICINE

## 2021-12-25 PROCEDURE — 82728 ASSAY OF FERRITIN: CPT | Performed by: STUDENT IN AN ORGANIZED HEALTH CARE EDUCATION/TRAINING PROGRAM

## 2021-12-25 PROCEDURE — 84484 ASSAY OF TROPONIN QUANT: CPT | Performed by: STUDENT IN AN ORGANIZED HEALTH CARE EDUCATION/TRAINING PROGRAM

## 2021-12-25 PROCEDURE — 87389 HIV-1 AG W/HIV-1&-2 AB AG IA: CPT | Performed by: STUDENT IN AN ORGANIZED HEALTH CARE EDUCATION/TRAINING PROGRAM

## 2021-12-25 PROCEDURE — 84100 ASSAY OF PHOSPHORUS: CPT | Mod: 91 | Performed by: STUDENT IN AN ORGANIZED HEALTH CARE EDUCATION/TRAINING PROGRAM

## 2021-12-25 PROCEDURE — 31500 PR INSERT, EMERGENCY ENDOTRACH AIRWAY: ICD-10-PCS | Mod: GC,,, | Performed by: INTERNAL MEDICINE

## 2021-12-25 PROCEDURE — 83735 ASSAY OF MAGNESIUM: CPT | Performed by: STUDENT IN AN ORGANIZED HEALTH CARE EDUCATION/TRAINING PROGRAM

## 2021-12-25 PROCEDURE — 25000003 PHARM REV CODE 250: Performed by: FAMILY MEDICINE

## 2021-12-25 PROCEDURE — 80053 COMPREHEN METABOLIC PANEL: CPT | Performed by: STUDENT IN AN ORGANIZED HEALTH CARE EDUCATION/TRAINING PROGRAM

## 2021-12-25 PROCEDURE — 82550 ASSAY OF CK (CPK): CPT | Performed by: STUDENT IN AN ORGANIZED HEALTH CARE EDUCATION/TRAINING PROGRAM

## 2021-12-25 RX ORDER — FENTANYL CITRATE-0.9 % NACL/PF 10 MCG/ML
0-250 PLASTIC BAG, INJECTION (ML) INTRAVENOUS CONTINUOUS
Status: DISCONTINUED | OUTPATIENT
Start: 2021-12-25 | End: 2021-12-28

## 2021-12-25 RX ORDER — SODIUM CHLORIDE 0.9 % (FLUSH) 0.9 %
3 SYRINGE (ML) INJECTION
Status: DISCONTINUED | OUTPATIENT
Start: 2021-12-25 | End: 2021-12-28 | Stop reason: HOSPADM

## 2021-12-25 RX ORDER — IBUPROFEN 200 MG
16 TABLET ORAL
Status: DISCONTINUED | OUTPATIENT
Start: 2021-12-25 | End: 2021-12-28 | Stop reason: HOSPADM

## 2021-12-25 RX ORDER — BUPRENORPHINE HYDROCHLORIDE AND NALOXONE HYDROCHLORIDE DIHYDRATE 8; 2 MG/1; MG/1
8 TABLET SUBLINGUAL DAILY
Status: DISCONTINUED | OUTPATIENT
Start: 2021-12-25 | End: 2021-12-26

## 2021-12-25 RX ORDER — PROPOFOL 10 MG/ML
INJECTION, EMULSION INTRAVENOUS
Status: DISPENSED
Start: 2021-12-25 | End: 2021-12-26

## 2021-12-25 RX ORDER — KETAMINE HCL IN 0.9 % NACL 50 MG/5 ML
SYRINGE (ML) INTRAVENOUS
Status: DISCONTINUED | OUTPATIENT
Start: 2021-12-25 | End: 2021-12-25

## 2021-12-25 RX ORDER — SODIUM CHLORIDE, SODIUM LACTATE, POTASSIUM CHLORIDE, CALCIUM CHLORIDE 600; 310; 30; 20 MG/100ML; MG/100ML; MG/100ML; MG/100ML
INJECTION, SOLUTION INTRAVENOUS CONTINUOUS
Status: DISCONTINUED | OUTPATIENT
Start: 2021-12-25 | End: 2021-12-25

## 2021-12-25 RX ORDER — HYDROCODONE BITARTRATE AND ACETAMINOPHEN 7.5; 3 MG/1; MG/1
1 TABLET ORAL ONCE
COMMUNITY
End: 2023-05-02

## 2021-12-25 RX ORDER — PROPOFOL 10 MG/ML
VIAL (ML) INTRAVENOUS CONTINUOUS PRN
Status: DISCONTINUED | OUTPATIENT
Start: 2021-12-25 | End: 2021-12-25

## 2021-12-25 RX ORDER — SODIUM CHLORIDE 9 MG/ML
INJECTION, SOLUTION INTRAVENOUS CONTINUOUS
Status: DISCONTINUED | OUTPATIENT
Start: 2021-12-25 | End: 2021-12-28 | Stop reason: HOSPADM

## 2021-12-25 RX ORDER — GLUCAGON 1 MG
1 KIT INJECTION
Status: DISCONTINUED | OUTPATIENT
Start: 2021-12-25 | End: 2021-12-28 | Stop reason: HOSPADM

## 2021-12-25 RX ORDER — LORAZEPAM 2 MG/ML
INJECTION INTRAMUSCULAR
Status: DISPENSED
Start: 2021-12-25 | End: 2021-12-26

## 2021-12-25 RX ORDER — MIDAZOLAM HYDROCHLORIDE 1 MG/ML
INJECTION, SOLUTION INTRAMUSCULAR; INTRAVENOUS
Status: DISCONTINUED | OUTPATIENT
Start: 2021-12-25 | End: 2021-12-25

## 2021-12-25 RX ORDER — METRONIDAZOLE 500 MG/100ML
500 INJECTION, SOLUTION INTRAVENOUS
Status: DISCONTINUED | OUTPATIENT
Start: 2021-12-25 | End: 2021-12-25

## 2021-12-25 RX ORDER — BUPRENORPHINE HYDROCHLORIDE AND NALOXONE HYDROCHLORIDE DIHYDRATE 8; 2 MG/1; MG/1
8 TABLET SUBLINGUAL ONCE
Status: COMPLETED | OUTPATIENT
Start: 2021-12-25 | End: 2021-12-25

## 2021-12-25 RX ORDER — SODIUM CHLORIDE 0.9 % (FLUSH) 0.9 %
.1-1 SYRINGE (ML) INJECTION
Status: DISCONTINUED | OUTPATIENT
Start: 2021-12-25 | End: 2021-12-28 | Stop reason: HOSPADM

## 2021-12-25 RX ORDER — ONDANSETRON 2 MG/ML
4 INJECTION INTRAMUSCULAR; INTRAVENOUS EVERY 8 HOURS PRN
Status: DISCONTINUED | OUTPATIENT
Start: 2021-12-25 | End: 2021-12-28 | Stop reason: HOSPADM

## 2021-12-25 RX ORDER — ALBENDAZOLE 200 MG/1
400 TABLET, FILM COATED ORAL 2 TIMES DAILY
Status: ON HOLD | COMMUNITY
Start: 2021-11-22 | End: 2021-12-28

## 2021-12-25 RX ORDER — PROPOFOL 10 MG/ML
VIAL (ML) INTRAVENOUS
Status: DISCONTINUED | OUTPATIENT
Start: 2021-12-25 | End: 2021-12-25

## 2021-12-25 RX ORDER — SODIUM CHLORIDE 0.9 % (FLUSH) 0.9 %
5 SYRINGE (ML) INJECTION
Status: DISCONTINUED | OUTPATIENT
Start: 2021-12-25 | End: 2021-12-28 | Stop reason: HOSPADM

## 2021-12-25 RX ORDER — BUPRENORPHINE HYDROCHLORIDE AND NALOXONE HYDROCHLORIDE DIHYDRATE 8; 2 MG/1; MG/1
8 TABLET SUBLINGUAL ONCE
Status: DISCONTINUED | OUTPATIENT
Start: 2021-12-25 | End: 2021-12-25

## 2021-12-25 RX ORDER — DEXMEDETOMIDINE HYDROCHLORIDE 4 UG/ML
INJECTION, SOLUTION INTRAVENOUS
Status: DISPENSED
Start: 2021-12-25 | End: 2021-12-26

## 2021-12-25 RX ORDER — ETOMIDATE 2 MG/ML
INJECTION INTRAVENOUS
Status: COMPLETED
Start: 2021-12-25 | End: 2021-12-25

## 2021-12-25 RX ORDER — DEXMEDETOMIDINE HYDROCHLORIDE 4 UG/ML
0-1.4 INJECTION, SOLUTION INTRAVENOUS CONTINUOUS
Status: DISCONTINUED | OUTPATIENT
Start: 2021-12-25 | End: 2021-12-28 | Stop reason: HOSPADM

## 2021-12-25 RX ORDER — CLONIDINE HYDROCHLORIDE 0.1 MG/1
0.1 TABLET ORAL ONCE
Status: COMPLETED | OUTPATIENT
Start: 2021-12-25 | End: 2021-12-25

## 2021-12-25 RX ORDER — ROCURONIUM BROMIDE 10 MG/ML
INJECTION, SOLUTION INTRAVENOUS
Status: COMPLETED
Start: 2021-12-25 | End: 2021-12-25

## 2021-12-25 RX ORDER — HYDROCODONE BITARTRATE AND ACETAMINOPHEN 500; 5 MG/1; MG/1
TABLET ORAL
Status: DISCONTINUED | OUTPATIENT
Start: 2021-12-25 | End: 2021-12-28 | Stop reason: HOSPADM

## 2021-12-25 RX ORDER — HALOPERIDOL 5 MG/ML
INJECTION INTRAMUSCULAR
Status: COMPLETED
Start: 2021-12-25 | End: 2021-12-25

## 2021-12-25 RX ORDER — IBUPROFEN 200 MG
24 TABLET ORAL
Status: DISCONTINUED | OUTPATIENT
Start: 2021-12-25 | End: 2021-12-28 | Stop reason: HOSPADM

## 2021-12-25 RX ORDER — EPINEPHRINE 0.1 MG/ML
INJECTION INTRAVENOUS
Status: COMPLETED | OUTPATIENT
Start: 2021-12-25 | End: 2021-12-25

## 2021-12-25 RX ORDER — SODIUM CHLORIDE, SODIUM LACTATE, POTASSIUM CHLORIDE, CALCIUM CHLORIDE 600; 310; 30; 20 MG/100ML; MG/100ML; MG/100ML; MG/100ML
INJECTION, SOLUTION INTRAVENOUS CONTINUOUS PRN
Status: DISCONTINUED | OUTPATIENT
Start: 2021-12-25 | End: 2021-12-25

## 2021-12-25 RX ORDER — SUCCINYLCHOLINE CHLORIDE 20 MG/ML
INJECTION INTRAMUSCULAR; INTRAVENOUS
Status: DISPENSED
Start: 2021-12-25 | End: 2021-12-26

## 2021-12-25 RX ORDER — NALOXONE HCL 0.4 MG/ML
0.02 VIAL (ML) INJECTION
Status: DISCONTINUED | OUTPATIENT
Start: 2021-12-25 | End: 2021-12-28 | Stop reason: HOSPADM

## 2021-12-25 RX ORDER — PANTOPRAZOLE SODIUM 40 MG/10ML
40 INJECTION, POWDER, LYOPHILIZED, FOR SOLUTION INTRAVENOUS EVERY 12 HOURS
Status: DISCONTINUED | OUTPATIENT
Start: 2021-12-25 | End: 2021-12-28 | Stop reason: HOSPADM

## 2021-12-25 RX ORDER — DEXMEDETOMIDINE HYDROCHLORIDE 100 UG/ML
INJECTION, SOLUTION INTRAVENOUS
Status: DISCONTINUED | OUTPATIENT
Start: 2021-12-25 | End: 2021-12-25

## 2021-12-25 RX ORDER — SODIUM CHLORIDE 9 MG/ML
INJECTION, SOLUTION INTRAVENOUS
Status: DISCONTINUED | OUTPATIENT
Start: 2021-12-25 | End: 2021-12-28 | Stop reason: HOSPADM

## 2021-12-25 RX ORDER — MIDAZOLAM HYDROCHLORIDE 1 MG/ML
4 INJECTION INTRAMUSCULAR; INTRAVENOUS ONCE
Status: DISCONTINUED | OUTPATIENT
Start: 2021-12-25 | End: 2021-12-28

## 2021-12-25 RX ORDER — DEXMEDETOMIDINE HYDROCHLORIDE 4 UG/ML
0-1.4 INJECTION, SOLUTION INTRAVENOUS CONTINUOUS
Status: DISCONTINUED | OUTPATIENT
Start: 2021-12-25 | End: 2021-12-25

## 2021-12-25 RX ADMIN — KETAMINE HYDROCHLORIDE 17.5 MCG/KG/MIN: 50 INJECTION INTRAMUSCULAR; INTRAVENOUS at 11:12

## 2021-12-25 RX ADMIN — MIDAZOLAM 4 MG: 1 INJECTION INTRAMUSCULAR; INTRAVENOUS at 01:12

## 2021-12-25 RX ADMIN — Medication 300 MCG/HR: at 11:12

## 2021-12-25 RX ADMIN — VANCOMYCIN HYDROCHLORIDE 2250 MG: 10 INJECTION, POWDER, LYOPHILIZED, FOR SOLUTION INTRAVENOUS at 10:12

## 2021-12-25 RX ADMIN — DEXMEDETOMIDINE HCL 50 MCG: 100 INJECTION INTRAVENOUS at 01:12

## 2021-12-25 RX ADMIN — Medication 25 MCG/HR: at 03:12

## 2021-12-25 RX ADMIN — PIPERACILLIN AND TAZOBACTAM 4.5 G: 4; .5 INJECTION, POWDER, LYOPHILIZED, FOR SOLUTION INTRAVENOUS; PARENTERAL at 08:12

## 2021-12-25 RX ADMIN — CEFTRIAXONE SODIUM 2 G: 2 INJECTION, POWDER, FOR SOLUTION INTRAMUSCULAR; INTRAVENOUS at 06:12

## 2021-12-25 RX ADMIN — GLYCOPYRROLATE 0.2 MG: 0.2 INJECTION, SOLUTION INTRAMUSCULAR; INTRAVITREAL at 01:12

## 2021-12-25 RX ADMIN — SODIUM CHLORIDE, SODIUM LACTATE, POTASSIUM CHLORIDE, AND CALCIUM CHLORIDE: .6; .31; .03; .02 INJECTION, SOLUTION INTRAVENOUS at 10:12

## 2021-12-25 RX ADMIN — LORAZEPAM 2 MG: 2 INJECTION INTRAMUSCULAR; INTRAVENOUS at 12:12

## 2021-12-25 RX ADMIN — MIDAZOLAM HYDROCHLORIDE 20 MG/HR: 5 INJECTION, SOLUTION INTRAMUSCULAR; INTRAVENOUS at 07:12

## 2021-12-25 RX ADMIN — SODIUM CHLORIDE, SODIUM LACTATE, POTASSIUM CHLORIDE, AND CALCIUM CHLORIDE: .6; .31; .03; .02 INJECTION, SOLUTION INTRAVENOUS at 01:12

## 2021-12-25 RX ADMIN — CLONIDINE HYDROCHLORIDE 0.1 MG: 0.1 TABLET ORAL at 12:12

## 2021-12-25 RX ADMIN — KETAMINE HYDROCHLORIDE 2.5 MCG/KG/MIN: 50 INJECTION INTRAMUSCULAR; INTRAVENOUS at 04:12

## 2021-12-25 RX ADMIN — PIPERACILLIN AND TAZOBACTAM 4.5 G: 4; .5 INJECTION, POWDER, LYOPHILIZED, FOR SOLUTION INTRAVENOUS; PARENTERAL at 04:12

## 2021-12-25 RX ADMIN — PROPOFOL 150 MCG/KG/MIN: 10 INJECTION, EMULSION INTRAVENOUS at 01:12

## 2021-12-25 RX ADMIN — PANTOPRAZOLE SODIUM 40 MG: 40 INJECTION, POWDER, LYOPHILIZED, FOR SOLUTION INTRAVENOUS at 08:12

## 2021-12-25 RX ADMIN — ROCURONIUM BROMIDE 100 MG: 10 INJECTION, SOLUTION INTRAVENOUS at 03:12

## 2021-12-25 RX ADMIN — DEXMEDETOMIDINE HCL 50 MCG: 100 INJECTION INTRAVENOUS at 02:12

## 2021-12-25 RX ADMIN — DEXMEDETOMIDINE HYDROCHLORIDE 1.3 MCG/KG/HR: 4 INJECTION, SOLUTION INTRAVENOUS at 05:12

## 2021-12-25 RX ADMIN — SODIUM CHLORIDE: 0.9 INJECTION, SOLUTION INTRAVENOUS at 06:12

## 2021-12-25 RX ADMIN — Medication 50 MG: at 01:12

## 2021-12-25 RX ADMIN — DEXMEDETOMIDINE HYDROCHLORIDE 1.4 MCG/KG/HR: 4 INJECTION, SOLUTION INTRAVENOUS at 02:12

## 2021-12-25 RX ADMIN — SODIUM CHLORIDE: 0.9 INJECTION, SOLUTION INTRAVENOUS at 04:12

## 2021-12-25 RX ADMIN — BUPRENORPHINE HYDROCHLORIDE AND NALOXONE HYDROCHLORIDE DIHYDRATE 8 MG: 8; 2 TABLET SUBLINGUAL at 11:12

## 2021-12-25 RX ADMIN — MIDAZOLAM 2 MG/HR: 5 INJECTION INTRAMUSCULAR; INTRAVENOUS at 03:12

## 2021-12-25 RX ADMIN — HALOPERIDOL LACTATE: 5 INJECTION, SOLUTION INTRAMUSCULAR at 02:12

## 2021-12-25 RX ADMIN — PROPOFOL 100 MG: 10 INJECTION, EMULSION INTRAVENOUS at 01:12

## 2021-12-25 RX ADMIN — VANCOMYCIN HYDROCHLORIDE 1750 MG: 10 INJECTION, POWDER, LYOPHILIZED, FOR SOLUTION INTRAVENOUS at 11:12

## 2021-12-25 RX ADMIN — PANTOPRAZOLE SODIUM 40 MG: 40 INJECTION, POWDER, LYOPHILIZED, FOR SOLUTION INTRAVENOUS at 09:12

## 2021-12-25 RX ADMIN — BUPRENORPHINE HYDROCHLORIDE AND NALOXONE HYDROCHLORIDE DIHYDRATE 8 MG: 8; 2 TABLET SUBLINGUAL at 12:12

## 2021-12-25 RX ADMIN — EPINEPHRINE 0.1 MG: 0.1 INJECTION INTRACARDIAC; INTRAVENOUS at 01:12

## 2021-12-25 RX ADMIN — ETOMIDATE 40 MG: 2 INJECTION INTRAVENOUS at 03:12

## 2021-12-25 NOTE — PLAN OF CARE
[facility] Plan of Care Note:    EGD performed for evaluation of melena and subsequent hematochezia with associated symptomatic anemia revealed:  - Normal esophagus.   - Normal stomach.   - Single, large cratered ulcer in the duodenal bulb with a non-bleeding visible vessel (Chase 2A). Treated with epinephrine injection and bipolar coagulation with no bleeding on completion of the procedure.     Plan:  - Trend CBC, transfuse PRBCs for hemoglobin <7  - Continue IV PPI BID for 72 hours; will need oral PPI BID for 8-weeks total   - Would obtain H. Pylori stool antigen; treat for eradication if positive.   - Recommend close monitoring in the ICU given high risk of recurrent bleeding and also for Neuro monitoring in the setting of his withdrawals.     Garrison Casillas MD  [facility] Fellow, PGY-4

## 2021-12-25 NOTE — ASSESSMENT & PLAN NOTE
Denies any recent manic episodes  Endorses he has been off all his medications for years now.  Will consult Psychiatry for evaluation of bipolar 1 disorder and drug use disorder.

## 2021-12-25 NOTE — HPI
"Boo Romano is a 56-year-old PMHx COPD, bipolar 1 disorder, chronic hepatitis C, IVDU (heroin) who presented to [facility] ED with complaint of bloody bowel movements and witnessed syncopal episode. Endorses bowel movements are black with mixed bright red blood. Endorses a 1 month history of bloody bowel movements but acute worsening yesterday. Patient was found by significant other with a witnessed syncopal episode while laying on sofa. Patient endorses associated mild epigastric pain, diffuse body weakness, skin pallor, and dizziness. Tried taking Pepto-Bismol at home for the diarrhea and abdominal pain but was provided little relief. Endorses pulling "large worms" out of his stools around 2 feet in length. Admits to daily IV heroin use due to chronic low back pain. Smokes 1/2 pack of cigarettes per day. Denies any alcohol use, chronic NSAID use, nausea/vomiting, abdominal distension, fever/chills.     In [facility] ED, patient hypotensive BP 97/62. CBC with anemia (hemoglobin 9.0) and leukocytosis (WBC count 18.69). Was provided 1 L bolus NS and IV Protonix 40 mg in ED. GI was consulted and patient was admitted [facility] for GI bleed.       "

## 2021-12-25 NOTE — RESPIRATORY THERAPY
Pt intubated by MD Lazarus Edmond at 1455 with 8.0 ETT 26 @lips. Tube placement confirmed by color change device and auscultation. Ventilator intitiated.

## 2021-12-25 NOTE — EICU
Rounding (Video Assessment):      Intervention Initiated From:  bedside    Roberto Communicated with Bedside Nurse regarding:  Called to room to record time out for emergent intubation    Nurse Notified:  At bedside    Doctor Notified:  At bedside    Comments:     1439:  20 Etomidate; 80 Rocuronium  1441: while attempting to intubate patient became hypoxic 40s; ambu bagging 100% FiO2 sats increased to 100%  1443 attempt to intubated successful; 8.0 26 @l ips

## 2021-12-25 NOTE — CARE UPDATE
Due concern over pain and opioid withdrawal (patient injects 1 gram heroin daily), patient started on 8 mg suboxone. Will continue to monitor signs/symptoms of withdrawal.    oTr Mejia MD PGY-1  [facility]  12/25/21 10:40

## 2021-12-25 NOTE — ED NOTES
Pt c/o generalized dizziness and almost passing out earlier this evening. Pt also c/o upper abdominal pain and black stool x 1 week. Pt states having symptoms before but drinking cabbage water and symptoms went away. Denies N/V/D. Denies taking medicine. Pt has a history of Hep B and Hep C. Respirations even and non-labored. AAO x 4.

## 2021-12-25 NOTE — PLAN OF CARE
Report received from REHANA Busch. Pt NPO since MN. AAO x 3. @ patent iv sites.Able to sign own consents.

## 2021-12-25 NOTE — PLAN OF CARE
Problem: Adult Inpatient Plan of Care  Goal: Plan of Care Review  Outcome: Ongoing, Progressing     VIRTUAL NURSE:  Labs, notes, orders, and careplan reviewed.       12/25/21 0440   Patient Request   Patient Requested requesting water   Nurse Notification   Bedside Nurse Notified? Yes   Name of Bedside Nurse REHANA Mazariegos   Nurse Notfication Method Telephone   Nurse Notified Of Patient Request   Admission   Initial VN Admission Questions Complete   Communication Issues? Technical Issue  (vidyoconnect not working; trouble shooted and now working)   Shift   Virtual Nurse - Rounding Complete   Pain Management Interventions pain management plan reviewed with patient/caregiver   Virtual Nurse - Patient Verbalized Approval Of Camera Use;VN Rounding   Type of Frequent Check   Type Patient Rounds   Safety/Activity   Patient Rounds bed wheels locked;bed in low position;call light in patient/parent reach;clutter free environment maintained;ID band on;placement of personal items at bedside;toileting offered;visualized patient   Safety Promotion/Fall Prevention assistive device/personal item within reach;medications reviewed;side rails raised x 2;instructed to call staff for mobility;commode/urinal/bedpan at bedside;diversional activities provided;Fall Risk reviewed with patient/family;high risk medications identified;nonskid shoes/socks when out of bed;supervised activity   Safety Precautions emergency equipment at bedside   Activity Management Ambulated to bathroom - L4   Positioning   Body Position neutral body alignment;neutral head position   Head of Bed (HOB) Positioning HOB at 30 degrees   Pain/Comfort/Sleep   Preferred Pain Scale number (Numeric Rating Pain Scale)   Comfort/Acceptable Pain Level 0   Pain Body Location abdomen   Pain Rating (0-10): Rest 7   Quality cramping   Sleep/Rest/Relaxation no problem identified;awake

## 2021-12-25 NOTE — PROCEDURES
"Boo Romano is a 56 y.o. male patient.    Temp: 98.4 °F (36.9 °C) (12/25/21 1311)  Pulse: (!) 133 (12/25/21 1335)  Resp: 20 (12/25/21 1335)  BP: (!) 173/81 (12/25/21 1335)  SpO2: 100 % (12/25/21 1335)  Weight: 93.4 kg (206 lb) (12/25/21 0930)  Height: 6' 2" (188 cm) (12/25/21 0930)       Intubation    Date/Time: 12/25/2021 2:55 PM  Location procedure was performed: [facility] ICU  Performed by: Leela Edmond MD  Authorized by: Leela Edmond MD   Assisting provider: Ellen Ricketts MD  Consent Done: Emergent Situation  Indications: airway protection  Intubation method: video-assisted  Patient status: paralyzed (RSI)  Preoxygenation: bag valve mask  Sedatives: etomidate  Paralytic: rocuronium  Laryngoscope size: Glide 4  Tube size: 8.0 mm  Tube type: cuffed  Number of attempts: 2  Ventilation between attempts: BVM  Cricoid pressure: no  Cords visualized: yes  Post-procedure assessment: CO2 detector  Breath sounds: clear and equal  Cuff inflated: yes  ETT to lip: 26 cm  ETT to teeth: 25 cm  Tube secured with: ETT becker  Patient tolerance: Patient tolerated the procedure well with no immediate complications  Comments: CXR ordered and pending          12/25/2021     Pulm/CCM Attending with Dr. Edmond    I was present for and supervised the entire endotracheal intubation for tube placement.   There were no complications and it was tolerated well.  Post-procedure CXR revealed that the tube needed to be advanced a few centimeters and that was done.  The tube was advanced so it was lower in the airway in the appropriate location.    Ellen Ricketts MD  12/25/2021       "

## 2021-12-25 NOTE — ASSESSMENT & PLAN NOTE
S/p 1 L Bolus NS and IV Protonix 40 mg in ED  One-month history of melanotic and bright red bloody stools. Patient denies any alcohol use/NSAID use but does endorse a chronic lifelong history of hepatitis-C infection since he was a teenager.    Plan:  [facility] consulted and will appreciate any recommendations  Keep NPO  IV Protonix 40mg Q12H  Vanc/zosyn for sepsis coverage   BP currently stable will consider IVF if needed  Will trend H&H Q4H  Follow-up Iron + TIBC, ferritin, folate, Vit B12 levels

## 2021-12-25 NOTE — PLAN OF CARE
Received patient from [facility] around 0400.  He was agitated and frustrated when he got here.  While taking his vitals he said he had had a bm and needed to clean himself up.  I offered to help him to the bathroom and he refused.  He did have bright red blood with clots in the toilet and the doctor took a picture of it.  He wanted water but is NPO.  He told me that the doctor said he could have a few sips. I verified it with the doctor and I gave him a little water.  After that he fell asleep.  He is alert & oriented x4.  He is up without assistance even though he is weak.  His vital signs are stable.  He does have abdominal pain.  All safety precautions are in place.  Report will be given to oncoming nurse.  I will continue to monitor patient.

## 2021-12-25 NOTE — CONSULTS
[facility]    CC: Bloody stools     HPI 56 y.o. male with history of IVDU (heroin), COPD, Bipolar disorder and chronic HCV presenting with acute-onset, large-volume hematochezia with associated epigastric abdominal pain, dizziness and syncope without associated nausea or vomiting. Patient initially had black, tarry bowel movements starting around 1 week ago with associated epigastric abdominal pain. He then developed hematochezia yesterday and had associated dizziness and one witnessed syncopal episodes. He denies any nausea or vomiting. He had chills a few days ago but denies any fevers. He does not take NSAIDs or anticoagulant medications at home. He has never had an EGD or colonoscopy.     Chart reviewed and summarized here.    Past Medical History  Past Medical History:   Diagnosis Date    Bipolar 1 disorder     Chronic hepatitis C 12/25/2021    COPD (chronic obstructive pulmonary disease)     Encounter for blood transfusion    IV drug use (heroin)    Past Surgical History  History reviewed. No pertinent surgical history.    Social History  Smokes 1 pack of cigarettes daily, denies alcohol use, uses IV heroin daily     Family History  No family history of colorectal cancer.     Review of Systems  General ROS: Positive for chills, fatigue and generalized weakness; negative for fever or weight loss  Psychological ROS: negative for hallucination, depression  Ophthalmic ROS: negative for blurry vision, photophobia or eye pain  ENT ROS: negative for epistaxis, sore throat or rhinorrhea  Respiratory ROS: no cough, shortness of breath, or wheezing  Cardiovascular ROS: no chest pain or dyspnea on exertion  Gastrointestinal ROS: Positive for abdominal pain and hematochezia; no nausea, vomiting  Genito-Urinary ROS: no dysuria, trouble voiding, or hematuria  Musculoskeletal ROS: negative for gait disturbance or muscular weakness  Neurological ROS: Positive for syncopal episode; no seizures; no  "ataxia  Dermatological ROS: negative for pruritis, rash and jaundice    Physical Examination  BP (!) 103/59 (BP Location: Left arm, Patient Position: Lying)   Pulse 98   Temp 98.4 °F (36.9 °C) (Oral)   Resp 18   Ht 6' 2" (1.88 m)   Wt 93.5 kg (206 lb 2.1 oz)   SpO2 98%   BMI 26.47 kg/m²   General appearance: alert, disheveled male, cooperative, no distress  HENT: Normocephalic, atraumatic, neck symmetrical, no nasal discharge   Eyes: conjunctivae/corneas clear, PERRL, EOM's intact  Lungs: clear to auscultation bilaterally, no dullness to percussion bilaterally  Heart: Tachycardic with regular rhythm without rub; no displacement of the PMI   Abdomen: soft, non-distended, tender to palpation in the epigastric area; bowel sounds normoactive; no organomegaly  Extremities: extremities symmetric; no clubbing, cyanosis, or edema  Integument: Skin color, texture, turgor normal; no rashes; hair distrubution normal  Neurologic: Alert and oriented X 3, normal strength, normal coordination  Psychiatric: no pressured speech; normal affect; no evidence of impaired cognition       Labs:  Hemoglobin 7.4 (MCV 84)  WBC 14.39  Platelets 280  Ferritin 32  BUN 33/Creatinine 0.7    Imaging:  I have personally reviewed and interpreted the chest xray images which did not reveal any acute cardiopulmonary disease process.      Assessment:   56 y.o. male with history of IVDU (heroin), COPD, Bipolar disorder and chronic HCV presenting with hematochezia with associated acute, symptomatic anemia. He denies any NSAID use at home. Tachycardic but otherwise hemodynamically stable. Hemoglobin gradually downtrending with elevated BUN on admission as well. His presentation is concerning for possible brisk upper GI bleeding from PUD vs. Dieulafoy lesion, and he will benefit from urgent evaluation with EGD. If EGD negative and patient has persistent GI blood loss, would consider CTA for further evaluation to rule out active bleeding in the small " bowel or colon.     Plan:   - Trend CBC, transfuse PRBCs for hemoglobin <7  - Follow up iron studies; consider IV iron supplementation while inpatient if iron deficient  - Would obtain HCV RNA viral load   - IV PPI BID  - NPO for EGD     Case discussed with Dr. Thomas.     Garrison Casillas MD  [facility], PGY-4

## 2021-12-25 NOTE — ED PROVIDER NOTES
Encounter Date: 12/24/2021       History     Chief Complaint   Patient presents with    GI Bleeding     Pt to the ER with c/o blood in stool. PT reports 6 times of bloody stools today. Pt reports problems 2 weeks ago but reports stopped. Pt reports this time blood for 2 days. Pt c/o weakness. Pt reports syncopal episode at home. Pt reports he has been taking Pepto Bismol     Patient currently presents with concern re LGIB.  This onset a few weeks ago but improved only to return over the past 2 days.  This has continues to worsen with 6 episodes of loose bloody stool today and 1 episode of syncope.  PMH notable for Hep B & C.  Patient makes additional note to concern for parasites crawling under and out of his skin..        Review of patient's allergies indicates:  No Known Allergies  Past Medical History:   Diagnosis Date    Bipolar 1 disorder     Chronic hepatitis C 12/25/2021    COPD (chronic obstructive pulmonary disease)     Encounter for blood transfusion      Past Surgical History:   Procedure Laterality Date    UPPER GASTROINTESTINAL ENDOSCOPY  12/25/2021     History reviewed. No pertinent family history.  Social History     Tobacco Use    Smoking status: Current Every Day Smoker     Packs/day: 1.00     Years: 40.00     Pack years: 40.00     Types: Cigarettes    Smokeless tobacco: Never Used   Substance Use Topics    Alcohol use: No    Drug use: Not Currently     Types: Methamphetamines     Review of Systems   Constitutional: Negative for chills and fever.   HENT: Negative for congestion and sore throat.    Respiratory: Negative for chest tightness and shortness of breath.    Cardiovascular: Negative for chest pain and palpitations.   Gastrointestinal: Positive for blood in stool and diarrhea. Negative for abdominal pain, constipation and vomiting.   Genitourinary: Negative for difficulty urinating and dysuria.   Skin: Negative for color change and rash.   Allergic/Immunologic: Negative for  immunocompromised state.   Neurological: Positive for syncope and weakness. Negative for numbness.   Hematological: Negative for adenopathy.   All other systems reviewed and are negative.      Physical Exam     Initial Vitals [12/24/21 2232]   BP Pulse Resp Temp SpO2   97/62 109 18 97.9 °F (36.6 °C) 97 %      MAP       --         Physical Exam    Constitutional: He appears well-developed and well-nourished. He is not diaphoretic. No distress.   HENT:   Head: Normocephalic and atraumatic.   Nose: Nose normal.   Mouth/Throat: Oropharynx is clear and moist.   Eyes: Conjunctivae are normal. No scleral icterus.   Neck: Neck supple. No JVD present.   Cardiovascular: Regular rhythm, normal heart sounds and intact distal pulses. Tachycardia present.    Pulmonary/Chest: Breath sounds normal. No respiratory distress.   Abdominal: Abdomen is soft. He exhibits no distension. There is no abdominal tenderness.   Musculoskeletal:      Cervical back: Neck supple.     Neurological: He is alert and oriented to person, place, and time. He has normal strength.   Skin: Skin is warm and dry. There is pallor.       ED Course   Procedures  Labs Reviewed   CBC W/ AUTO DIFFERENTIAL - Abnormal; Notable for the following components:       Result Value    WBC 18.69 (*)     RBC 3.34 (*)     Hemoglobin 9.0 (*)     Hematocrit 28.5 (*)     MCH 26.9 (*)     MCHC 31.6 (*)     Immature Granulocytes 0.6 (*)     Gran # (ANC) 14.3 (*)     Immature Grans (Abs) 0.12 (*)     Mono # 1.1 (*)     Gran % 76.4 (*)     Lymph % 15.8 (*)     All other components within normal limits   COMPREHENSIVE METABOLIC PANEL - Abnormal; Notable for the following components:    CO2 22 (*)     Glucose 154 (*)     BUN 40 (*)     Calcium 8.3 (*)     Albumin 3.3 (*)     All other components within normal limits   PROTIME-INR   SARS-COV-2 RDRP GENE          Imaging Results    None          Medications   sodium chloride 0.9% flush 5 mL (has no administration in time range)    naloxone 0.4 mg/mL injection 0.02 mg (has no administration in time range)   ondansetron injection 4 mg (has no administration in time range)   glucose chewable tablet 16 g (has no administration in time range)   glucose chewable tablet 24 g (has no administration in time range)   dextrose 50% injection 12.5 g (has no administration in time range)   dextrose 50% injection 25 g (has no administration in time range)   glucagon (human recombinant) injection 1 mg (has no administration in time range)   pantoprazole injection 40 mg (40 mg Intravenous Given 12/25/21 0837)   sodium chloride 0.9% flush 3 mL (has no administration in time range)   sodium chloride 0.9% flush 0.1-10 mL (has no administration in time range)   0.9%  NaCl infusion ( Intravenous Stopped 12/25/21 0604)   vancomycin - pharmacy to dose (has no administration in time range)   piperacillin-tazobactam 4.5 g in dextrose 5 % 100 mL IVPB (ready to mix system) (4.5 g Intravenous New Bag 12/25/21 1618)   vancomycin (VANCOCIN) 1,750 mg in dextrose 5 % 500 mL IVPB (1,750 mg Intravenous Trough Due As Scheduled Before Dose 12/26/21 1830)   0.9%  NaCl infusion (for blood administration) (has no administration in time range)   buprenorphine-naloxone 8-2 SL tablet 8 mg (8 mg Sublingual Given 12/25/21 1103)   lorazepam (ATIVAN) 2 mg/mL injection (  Not Given 12/25/21 1230)   EPINEPHrine 0.1 mg/mL injection (0.1 mg Intravenous Given 12/25/21 1336)   dexmedeTOMIDine in 0.9 % NaCL 400 mcg/100 mL (4 mcg/mL) infusion (400 mcg  Not Given 12/25/21 1437)   propofoL (DIPRIVAN) 10 mg/mL infusion (  Not Given 12/25/21 1445)   succinylcholine (ANECTINE) 20 mg/mL injection (  Not Given 12/25/21 1445)   dexmedetomidine (PRECEDEX) 400mcg/100mL 0.9% NaCL infusion (1.4 mcg/kg/hr × 93.4 kg Intravenous New Bag 12/25/21 1446)   fentaNYL 2500 mcg in 0.9% sodium chloride 250 mL infusion premix (titrating) (25 mcg/hr Intravenous New Bag 12/25/21 9629)   midazolam 100 mg/100 mL in  dextrose 5% infusion (2 mg/hr Intravenous New Bag 12/25/21 1541)   midazolam (VERSED) 1 mg/mL injection 4 mg ( Intravenous Canceled Entry 12/25/21 1645)   0.9%  NaCl infusion ( Intravenous New Bag 12/25/21 1614)   ketamine 500 mg/100 mL (5 mg/mL) in NS infusion (has no administration in time range)   pantoprazole injection 40 mg (40 mg Intravenous Given 12/24/21 2310)   sodium chloride 0.9% bolus 1,000 mL (0 mLs Intravenous Stopped 12/24/21 2354)   vancomycin (VANCOCIN) 2,250 mg in dextrose 5 % 500 mL IVPB (0 mg Intravenous Stopped 12/25/21 1241)   lorazepam (ATIVAN) injection 2 mg (2 mg Intravenous Given 12/25/21 1214)   cloNIDine tablet 0.1 mg (0.1 mg Oral Given 12/25/21 1226)   lorazepam (ATIVAN) injection 2 mg (2 mg Intravenous Given 12/25/21 1252)   buprenorphine-naloxone 8-2 SL tablet 8 mg (8 mg Sublingual Given 12/25/21 1242)   haloperidol lactate (HALDOL) 5 mg/mL injection (  Given by Other 12/25/21 1430)   rocuronium 10 mg/mL injection (100 mg  Given 12/25/21 1545)   etomidate (AMIDATE) 2 mg/mL injection (40 mg  Given 12/25/21 1545)     Medical Decision Making:   ED Management:  All historical, clinical, radiographic, and laboratory findings were reviewed with the patient/family in detail along with the indications for transport to the facility in [place] in order to receive GI consultation and consideration for endoscopy.  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  All remaining questions and concerns were addressed at this time and the patient/family communicates understanding and agrees to proceed accordingly.  Similarly, all pertinent details of the encounter were discussed with Dr. RIRI Bell who agrees to receive the patient at [facility] for further care as outlined above.  The patient will be transferred by [facility] ambulance services  secondary to a need for ongoing cardiac monitoring and IV fluids en route.                        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

## 2021-12-25 NOTE — PLAN OF CARE
Pt awoke more agitated and kicking legs and arms. Not able to follow direction. Dr Thomas reccomends transferring pt to ICU . Dr Casillas contacted House Supervisor to obtain bed stat. Per order Dr Gunn, 4 point soft resrtaints applied to keep pt and staff safe, after medications were not having any effect on pt.  Report phoned to REHANA Concepcion in ICU. Pt transported to ICU.  .

## 2021-12-25 NOTE — NURSING
1430 - Pt arrived on unit in 4 point restraints. Pt restless, agitated and thrashing in bed. Security at bedside to help transfer pt to ICU bed. Pt hypertensive, tachypneic, and tachycardic.   1435 Haldol given with no effect. Decision made to intubate for pt and staff safety.    1445 - Pt was intubated. 8.0 ETT. OGT placed per Md order. Xray called to bedside.  1452 - Pt remains restless and agitated. Pt thrashing in bed and hitting side rails. Per MD order increase precedex drip to 1.4.  1529 - Pt remains restless and agitated despite max precedex drip. Per MD order increase fentanyl to max of 300.   1546 - Pt remains restless and agitated despite precedex and fentanyl maxed. Pt continues to thrash in bed and hit side rails. Per MD order increase versed drip to 10 mg/hr  1605 - No effect from precedex, fent, and versed drip. Per MD increase dose of versed to 20 mg/hr.   1630 - Pt continues to thrash in bed and hit side rails. No effect seen from previous medications. MD to add ketamine and discontinue precedex.

## 2021-12-25 NOTE — SUBJECTIVE & OBJECTIVE
Interval History: Patient continuing to endorse abdominal pain, one episode of melanotic stool since arrival to the ED, otherwise resting comfortably in bed. No other complaints at this time.    Review of Systems   Constitutional: Negative for chills and fever.   Respiratory: Negative for cough and shortness of breath.    Gastrointestinal: Positive for abdominal pain. Negative for abdominal distention, diarrhea, nausea and vomiting.   Genitourinary: Negative for dysuria.   Musculoskeletal: Negative for arthralgias and myalgias.   Neurological: Negative for dizziness and headaches.     Objective:     Vital Signs (Most Recent):  Temp: 98.4 °F (36.9 °C) (12/25/21 0402)  Pulse: 98 (12/25/21 0402)  Resp: 18 (12/25/21 0402)  BP: (!) 103/59 (12/25/21 0402)  SpO2: 98 % (12/25/21 0402) Vital Signs (24h Range):  Temp:  [97.9 °F (36.6 °C)-98.4 °F (36.9 °C)] 98.4 °F (36.9 °C)  Pulse:  [] 98  Resp:  [11-20] 18  SpO2:  [95 %-100 %] 98 %  BP: ()/(56-63) 103/59     Weight: 93.5 kg (206 lb 2.1 oz)  Body mass index is 26.47 kg/m².    Intake/Output Summary (Last 24 hours) at 12/25/2021 0749  Last data filed at 12/25/2021 0729  Gross per 24 hour   Intake 1019.39 ml   Output --   Net 1019.39 ml      Physical Exam  Constitutional:       General: He is not in acute distress.     Appearance: He is not toxic-appearing or diaphoretic.   Eyes:      Extraocular Movements: Extraocular movements intact.      Pupils: Pupils are equal, round, and reactive to light.   Cardiovascular:      Rate and Rhythm: Normal rate and regular rhythm.      Pulses: Normal pulses.      Heart sounds: Normal heart sounds.   Pulmonary:      Effort: Pulmonary effort is normal. No respiratory distress.      Breath sounds: Normal breath sounds.   Abdominal:      General: Abdomen is flat. Bowel sounds are normal. There is no distension.      Palpations: Abdomen is soft.      Tenderness: There is abdominal tenderness. There is guarding.   Musculoskeletal:          General: Normal range of motion.      Cervical back: Normal range of motion and neck supple.   Skin:     Capillary Refill: Capillary refill takes less than 2 seconds.   Neurological:      General: No focal deficit present.      Mental Status: He is oriented to person, place, and time.   Psychiatric:      Comments: Bizarre affect           Significant Labs:   A1C:   Recent Labs   Lab 12/25/21  0503   HGBA1C 5.7*     CBC:   Recent Labs   Lab 12/24/21 2303 12/25/21  0503   WBC 18.69* 14.39*   HGB 9.0* 8.4*   HCT 28.5* 26.1*    280     CMP:   Recent Labs   Lab 12/24/21 2303 12/25/21  0502    137   K 4.3 4.4    108   CO2 22* 21*   * 121*   BUN 40* 33*   CREATININE 0.75 0.7   CALCIUM 8.3* 8.5*   PROT 6.1 5.7*   ALBUMIN 3.3* 3.1*   BILITOT 0.2 0.3   ALKPHOS 88 79   AST 18 13   ALT 18 14   ANIONGAP 9 8   EGFRNONAA >60.0 >60     Lactic Acid:   Recent Labs   Lab 12/25/21  0503   LACTATE 1.3     TSH:   Recent Labs   Lab 12/25/21  0503   TSH 0.427       Significant Imaging: I have reviewed all pertinent imaging results/findings within the past 24 hours.

## 2021-12-25 NOTE — CONSULTS
Pulmonology/Critical Care Consult Note:    Chief complaint: agitated delirium    HPI: Mr. Romano is a 55 yo M with a PMH of daily heroin and methamphetamine use, Bipolar 1 disorder, HBV, HCV who presented to the [facility] with multiple episodes of BRBPR. Per chart review he has been having blood in his stools for 1 month but this acutely worsened yesterday. His significant other witnessed him having a syncopal episode and brought him to the ED, where he was transferred to [facility] for GI procedure. Received zosyn/vanc for possible sepsis.     Per chart documentation of discussion with Family Med team, he was interested in initiating suboxone therapy and received 16mg total prior to procedure. Also received clonidine, 4mg ativan, and a significant amount of ketamine and propofol per Anesthesia team and was still maintaining his airway without intubation. EGD was then done and a single large cratered ulcer found with non-bleeding vessel, epi injected and coagulated.     On arrival to the MICU, he was agitated, thrashing in the bed, banging extremities on bed railings, disoriented and took multiple staff members to safely restrain him. He was hypertensive, tachycardic, but maintaining good saturations on room air. Attempted 5mg push of haldol for sedation which had no effect. Due to his likelihood of injuring himself or medical staff, he was then sedated and intubated.     Objective:  Vitals: reviewed  General: extremely agitated, disoriented, eyes closed and thrashing  HEENT: nonicteric, MMM, no JVD  CV: tachycardic, no murmurs, no edema  Resp: CTAB, tachypneic, no wheezing or crackles  Abd: soft, NTTP  Extr: no cyanosis or edema, no clubbing  Neuro: as above- no focal deficits, unable to follow commands  24hr Intake/Output: 1L  Lines and tubes: PIV    Labs: Impression: leukocytosis improving, Hb post-procedure pending. No EH.     Imaging: CXR 12/25 wnl    Echo: 12/25:  · Normal systolic function.  · The estimated ejection  fraction is 65%.  · Normal left ventricular diastolic function.  · Normal right ventricular size with normal right ventricular systolic function.  · Normal central venous pressure (3 mmHg).    Assessment:  1. Agitated delirium requiring intubation and sedation  2. GI bleed s/p intervention  3. Polysubstance use/withdrawal    Plan/Recommendations:  Neuro:  #agitated delirium: refractory to propofol and ketamine per anesthesia. May have been worsened by suboxone admin. Will attempt good sedation with versed, precedex, and fentanyl drips for now. SAT in am.   #polysubstance use: unclear toxidrome, appears sympathomimetic from meth vs PCP. Tox + for opiates and amphetamines. Would avoid suboxone admin for now.     Cards:  #HTN, tachycardia: related to toxidrome above, will reassess once well-sedated    Resp:  #Intubation for airway protection: continue LPMV with   Vent day and settings: day 1, AC/VC 22/480/5/40%  Blood gases: 7.43/35/138    FEN/GI:  NPO for now, has received sufficient fluids  K>4, Mg>2, Phos>3  Chronic HCV, HBV, no hx cirrhosis  #Acute GI bleed s/p intervention: continue PPI IV BID, continue CBC q8, transfuse for <7 or in shock    Renal: no acute issues  Cr: 0.7    Endo:  Low- normal TSH, no hx hyperthyroidism  Glucose checks q6, Hb 5.7    Heme/ID:  No acute issues, does not appear acutely infected  Abx and day: vanc/zosyn x 1  Cultures: Bcx NGTD    Gi PPX: PPI IV BID  DVT ppx: SCDs, acute GI bleed  Code status: FULL  Family: Keely, patient's sister is at bedside and updated    Thank you for the consult. We will continue to follow along with you. Discussed with attending Dr. Jamarcus Edmond MD  PCCM fellow    Pulmonary/Critical Care Attending with Dr. Edmond    Patient seen and personally examined on rounds with Dr. Edmond after review of PMH, reason for admission, hospital course, labs and xrays.  I have reviewed, we discussed, and I have verified her findings, assessment and recommendations.   The patient became agitated today after a procedure for a bleeding ulcer and required intubation, as he was unable to cooperate.  He was sedated and intubated and we will reassess for extubation in the morning.   Critical care time 35 min for review of chart, lab data and images, examination of the patient, monitoring, and adjusting as necessary the management plan for support of vital organ system (respiratory), and also for discussion with other caregivers and the family members.    lElen Ricketts MD  12/25/2021  6:31 PM

## 2021-12-25 NOTE — ASSESSMENT & PLAN NOTE
Chronic daily heroin use  Drug screen panel positive for opiates  Will order HIV, acute hepatitis panel, and blood cultures  Consider Suboxone, will need further discussions with patient   Problem: Nutrition  Goal: Optimal nutrition therapy  Outcome: Ongoing   Nutrition Problem #1: Severe malnutrition, In context of acute illness or injury  Intervention: Food and/or Nutrient Delivery: Continue Current Diet, Start Oral Nutrition Supplement  Nutritional Goals: consume greater than 75% meals and ONS during LOS

## 2021-12-25 NOTE — H&P
"[facility] Medicine  History & Physical    Patient Name: Boo Romano  MRN: [id number]  Patient Class: OP- Observation  Admission Date: 12/24/2021  Attending Physician: Luigi Bell III, MD   Primary Care Provider: Ervin Childress Iii, MD         Patient information was obtained from patient, past medical records and ER records.     Subjective:     Principal Problem:GI bleed    Chief Complaint:   Chief Complaint   Patient presents with    GI Bleeding     Pt to the ER with c/o blood in stool. PT reports 6 times of bloody stools today. Pt reports problems 2 weeks ago but reports stopped. Pt reports this time blood for 2 days. Pt c/o weakness. Pt reports syncopal episode at home. Pt reports he has been taking Pepto Bismol        HPI: Boo Romano is a 56-year-old PMHx COPD, bipolar 1 disorder, chronic hepatitis C, IVDU (heroin) who presented to [facility] ED with complaint of bloody bowel movements and witnessed syncopal episode. Endorses bowel movements are black with mixed bright red blood. Endorses a 1 month history of bloody bowel movements but acute worsening yesterday. Patient was found by significant other with a witnessed syncopal episode while laying on sofa. Patient endorses associated mild epigastric pain, diffuse body weakness, skin pallor, and dizziness. Tried taking Pepto-Bismol at home for the diarrhea and abdominal pain but was provided little relief. Endorses pulling "large worms" out of his stools around 2 feet in length. Admits to daily IV heroin use due to chronic low back pain. Smokes 1/2 pack of cigarettes per day. Denies any alcohol use, chronic NSAID use, nausea/vomiting, abdominal distension, fever/chills.     In [facility] ED, patient hypotensive BP 97/62. CBC with anemia (hemoglobin 9.0) and leukocytosis (WBC count 18.69). Was provided 1 L bolus NS and IV Protonix 40 mg in ED. GI was consulted and patient was [facility] for [facility] bleed. "           Past Medical History:   Diagnosis Date    Bipolar 1 disorder     COPD (chronic obstructive pulmonary disease)     Encounter for blood transfusion        History reviewed. No pertinent surgical history.    Review of patient's allergies indicates:  No Known Allergies    No current facility-administered medications on file prior to encounter.     Current Outpatient Medications on File Prior to Encounter   Medication Sig    HYDROcodone-acetaminophen 7.5-300 mg Tab Take 1 tablet by mouth once. Received from a friend    albuterol (ACCUNEB) 0.63 mg/3 mL Nebu Take 3 mLs (0.63 mg total) by nebulization every 6 (six) hours as needed. Rescue    [DISCONTINUED] azithromycin (Z-ISAURA) 250 MG tablet Take 1 tablet (250 mg total) by mouth once daily. Take first 2 tablets together, then 1 every day until finished.    [DISCONTINUED] hydrocodone-homatropine 5-1.5 mg/5 ml (HYCODAN) 5-1.5 mg/5 mL Syrp Take 5 mLs by mouth every 4 (four) hours as needed (cough).     Family History    None       Tobacco Use    Smoking status: Current Every Day Smoker     Packs/day: 1.00     Years: 40.00     Pack years: 40.00     Types: Cigarettes    Smokeless tobacco: Never Used   Substance and Sexual Activity    Alcohol use: No    Drug use: Not Currently     Types: Methamphetamines    Sexual activity: Yes     Partners: Female     Review of Systems   Constitutional: Negative for chills and fever.   HENT: Negative.    Eyes: Negative for visual disturbance.   Respiratory: Negative for shortness of breath.    Cardiovascular: Negative for chest pain and palpitations.   Gastrointestinal: Positive for abdominal pain (epigastic), blood in stool and diarrhea. Negative for abdominal distention, nausea and vomiting.   Genitourinary: Negative.    Musculoskeletal: Negative.    Neurological: Positive for dizziness, syncope and weakness. Negative for seizures, numbness and headaches.   Psychiatric/Behavioral: Negative for dysphoric mood. The patient  is not nervous/anxious.      Objective:     Vital Signs (Most Recent):  Temp: 98.4 °F (36.9 °C) (12/25/21 0402)  Pulse: 98 (12/25/21 0402)  Resp: 18 (12/25/21 0402)  BP: (!) 103/59 (12/25/21 0402)  SpO2: 98 % (12/25/21 0402) Vital Signs (24h Range):  Temp:  [97.9 °F (36.6 °C)-98.4 °F (36.9 °C)] 98.4 °F (36.9 °C)  Pulse:  [] 98  Resp:  [11-20] 18  SpO2:  [95 %-100 %] 98 %  BP: ()/(56-63) 103/59     Weight: 95.3 kg (210 lb)  Body mass index is 26.96 kg/m².    Physical Exam  Constitutional:       General: He is not in acute distress.     Appearance: Normal appearance. He is not ill-appearing.   HENT:      Head: Normocephalic and atraumatic.   Cardiovascular:      Rate and Rhythm: Normal rate and regular rhythm.      Pulses: Normal pulses.      Heart sounds: No murmur heard.  No friction rub.   Pulmonary:      Effort: Pulmonary effort is normal. No respiratory distress.      Breath sounds: Normal breath sounds. No wheezing, rhonchi or rales.   Abdominal:      General: Abdomen is flat. There is no distension.      Tenderness: There is abdominal tenderness (mild epigastric). There is no guarding or rebound.   Musculoskeletal:         General: Normal range of motion.      Comments: Track marks on left upper extremity   Skin:     General: Skin is warm.      Coloration: Skin is pale.   Neurological:      Mental Status: He is alert and oriented to person, place, and time.   Psychiatric:         Mood and Affect: Mood normal.         Behavior: Behavior normal.        Significant Labs:   All pertinent labs within the past 24 hours have been reviewed.    Bilirubin:   Recent Labs   Lab 12/24/21 2303   BILITOT 0.2     CBC:   Recent Labs   Lab 12/24/21 2303   WBC 18.69*   HGB 9.0*   HCT 28.5*        CMP:   Recent Labs   Lab 12/24/21 2303      K 4.3      CO2 22*   *   BUN 40*   CREATININE 0.75   CALCIUM 8.3*   PROT 6.1   ALBUMIN 3.3*   BILITOT 0.2   ALKPHOS 88   AST 18   ALT 18   ANIONGAP 9    EGFRNONAA >60.0     Coagulation:   Recent Labs   Lab 12/24/21  2308   INR 1.1       Significant Imaging:  I have reviewed all pertinent imaging results/findings within the past 24 hours.     Assessment/Plan:     * GI bleed  S/p 1 L Bolus NS and IV Protonix 40 mg in ED  Hemoglobin 9 on admission  One-month history of melanotic and bright red bloody stools. Patient denies any alcohol use/NSAID use but does endorse a chronic lifelong history of hepatitis-C infection since he was a teenager.     Plan:  [facility] consulted and will appreciate any recommendations  Keep NPO  IV Protonix 40mg Q12H  Will start broad spectrum Vanc and Zosyn given concern for sepsis  BP currently stable will consider IVF if needed  Will trend H&H Q4H  Transfuse if hemoglobin < 7  Follow-up BloodCx, Iron + TIBC, ferritin, folate, Vit B12 levels  If varices is suspected  - Octreotide 50mcg IV bolus x 1 then 50mcg/hr gtt     Bipolar 1 disorder  Denies any recent manic episodes  Endorses he has been off all his medications for years now  Currently stable will consider outpatient evaluation      IVDU (intravenous drug user)  Chronic daily heroin use  Drug screen panel positive for opiates  Will order HIV and acute hepatitis panel  Will consider Suboxone, will need further discussions with patient  On broad spectrum Vanc and Zosyn    VTE Risk Mitigation (From admission, onward)         Ordered     IP VTE LOW RISK PATIENT  Once         12/25/21 0407     Place sequential compression device  Until discontinued         12/25/21 0407                   David Parmar,   [facility], PGY-2  Department of Hospital Medicine

## 2021-12-25 NOTE — PROGRESS NOTES
"[facility] Medicine  Progress Note    Patient Name: Boo Romano  MRN: [id number]  Patient Class: OP- Observation   Admission Date: 12/24/2021  Length of Stay: 0 days  Attending Physician: Luigi Bell III, MD  Primary Care Provider: Ervin Childress Iii, MD        Subjective:     Principal Problem:GI bleed        HPI:  Boo Romano is a 56-year-old PMHx COPD, bipolar 1 disorder, chronic hepatitis C, IVDU (heroin) who presented to [facility] ED with complaint of bloody bowel movements and witnessed syncopal episode. Endorses bowel movements are black with mixed bright red blood. Endorses a 1 month history of bloody bowel movements but acute worsening yesterday. Patient was found by significant other with a witnessed syncopal episode while laying on sofa. Patient endorses associated mild epigastric pain, diffuse body weakness, skin pallor, and dizziness. Tried taking Pepto-Bismol at home for the diarrhea and abdominal pain but was provided little relief. Endorses pulling "large worms" out of his stools around 2 feet in length. Admits to daily IV heroin use due to chronic low back pain. Smokes 1/2 pack of cigarettes per day. Denies any alcohol use, chronic NSAID use, nausea/vomiting, abdominal distension, fever/chills.     In [facility] ED, patient hypotensive BP 97/62. CBC with anemia (hemoglobin 9.0) and leukocytosis (WBC count 18.69). Was provided 1 L bolus NS and IV Protonix 40 mg in ED. GI was consulted and patient was [facility] for [facility] bleed.           Overview/Hospital Course:  No notes on file    Interval History: Patient continuing to endorse abdominal pain, one episode of melanotic stool since arrival to the ED, otherwise resting comfortably in bed. No other complaints at this time.    Review of Systems   Constitutional: Negative for chills and fever.   Respiratory: Negative for cough and shortness of breath.    Gastrointestinal: Positive for abdominal pain. " Negative for abdominal distention, diarrhea, nausea and vomiting.   Genitourinary: Negative for dysuria.   Musculoskeletal: Negative for arthralgias and myalgias.   Neurological: Negative for dizziness and headaches.     Objective:     Vital Signs (Most Recent):  Temp: 98.4 °F (36.9 °C) (12/25/21 0402)  Pulse: 98 (12/25/21 0402)  Resp: 18 (12/25/21 0402)  BP: (!) 103/59 (12/25/21 0402)  SpO2: 98 % (12/25/21 0402) Vital Signs (24h Range):  Temp:  [97.9 °F (36.6 °C)-98.4 °F (36.9 °C)] 98.4 °F (36.9 °C)  Pulse:  [] 98  Resp:  [11-20] 18  SpO2:  [95 %-100 %] 98 %  BP: ()/(56-63) 103/59     Weight: 93.5 kg (206 lb 2.1 oz)  Body mass index is 26.47 kg/m².    Intake/Output Summary (Last 24 hours) at 12/25/2021 0749  Last data filed at 12/25/2021 0729  Gross per 24 hour   Intake 1019.39 ml   Output --   Net 1019.39 ml      Physical Exam  Constitutional:       General: He is not in acute distress.     Appearance: He is not toxic-appearing or diaphoretic.   Eyes:      Extraocular Movements: Extraocular movements intact.      Pupils: Pupils are equal, round, and reactive to light.   Cardiovascular:      Rate and Rhythm: Normal rate and regular rhythm.      Pulses: Normal pulses.      Heart sounds: Normal heart sounds.   Pulmonary:      Effort: Pulmonary effort is normal. No respiratory distress.      Breath sounds: Normal breath sounds.   Abdominal:      General: Abdomen is flat. Bowel sounds are normal. There is no distension.      Palpations: Abdomen is soft.      Tenderness: There is abdominal tenderness. There is guarding.   Musculoskeletal:         General: Normal range of motion.      Cervical back: Normal range of motion and neck supple.   Skin:     Capillary Refill: Capillary refill takes less than 2 seconds.   Neurological:      General: No focal deficit present.      Mental Status: He is oriented to person, place, and time.   Psychiatric:      Comments: Bizarre affect           Significant Labs:   A1C:    Recent Labs   Lab 12/25/21  0503   HGBA1C 5.7*     CBC:   Recent Labs   Lab 12/24/21  2303 12/25/21  0503   WBC 18.69* 14.39*   HGB 9.0* 8.4*   HCT 28.5* 26.1*    280     CMP:   Recent Labs   Lab 12/24/21  2303 12/25/21  0502    137   K 4.3 4.4    108   CO2 22* 21*   * 121*   BUN 40* 33*   CREATININE 0.75 0.7   CALCIUM 8.3* 8.5*   PROT 6.1 5.7*   ALBUMIN 3.3* 3.1*   BILITOT 0.2 0.3   ALKPHOS 88 79   AST 18 13   ALT 18 14   ANIONGAP 9 8   EGFRNONAA >60.0 >60     Lactic Acid:   Recent Labs   Lab 12/25/21  0503   LACTATE 1.3     TSH:   Recent Labs   Lab 12/25/21  0503   TSH 0.427       Significant Imaging: I have reviewed all pertinent imaging results/findings within the past 24 hours.      Assessment/Plan:      * GI bleed  S/p 1 L Bolus NS and IV Protonix 40 mg in ED  One-month history of melanotic and bright red bloody stools. Patient denies any alcohol use/NSAID use but does endorse a chronic lifelong history of hepatitis-C infection since he was a teenager.    Plan:  [facility] consulted and will appreciate any recommendations  Keep NPO  IV Protonix 40mg Q12H  Vanc/zosyn for sepsis coverage   BP currently stable will consider IVF if needed  Will trend H&H Q4H  Follow-up Iron + TIBC, ferritin, folate, Vit B12 levels      Bipolar 1 disorder  Denies any recent manic episodes  Endorses he has been off all his medications for years now.  Will consider adding antipsychotic if symptomatic      IVDU (intravenous drug user)  Chronic daily heroin use  Drug screen panel positive for opiates  Will order HIV, acute hepatitis panel, and blood cultures  Consider Suboxone, will need further discussions with patient  Rule out endocarditis with TTE    Chronic hepatitis C  Follow-up acute hepatitis panel      Acute blood loss anemia          VTE Risk Mitigation (From admission, onward)         Ordered     IP VTE LOW RISK PATIENT  Once         12/25/21 0407     Place sequential compression device  Until  discontinued         12/25/21 0407                Discharge Planning   SHANA:      Code Status: Full Code   Is the patient medically ready for discharge?:     Reason for patient still in hospital (select all that apply): Treatment and Consult recommendations                     Tor Mejia MD PGY-1  Department of [facility]   [facility]

## 2021-12-25 NOTE — ASSESSMENT & PLAN NOTE
Denies any recent manic episodes  Endorses he has been off all his medications for years now.  Will consider adding antipsychotic if symptomatic

## 2021-12-25 NOTE — PROGRESS NOTES
Pharmacokinetic Initial Assessment: IV Vancomycin    Assessment/Plan:    Initiate intravenous vancomycin with loading dose of 2250 mg once followed by a maintenance dose of vancomycin 1750mg IV every 12 hours  Desired empiric serum trough concentration is 10 to 15 mcg/mL  Draw vancomycin trough level 60 min prior to fourth dose on 12/26/21 at approximately 1830  Pharmacy will continue to follow and monitor vancomycin.      Please contact pharmacy at extension [phone] with any questions regarding this assessment.     Thank you for the consult,   Yovany Lorenzo       Patient brief summary:  Boo Romano is a 56 y.o. male initiated on antimicrobial therapy with IV Vancomycin for treatment of suspected skin and soft tissue    Drug Allergies:   Review of patient's allergies indicates:  No Known Allergies    Actual Body Weight:   93kg    Renal Function:   Estimated Creatinine Clearance: 140.3 mL/min (based on SCr of 0.7 mg/dL).,     Dialysis Method (if applicable):  N/A    CBC (last 72 hours):  Recent Labs   Lab Result Units 12/24/21  2303 12/25/21  0503   WBC K/uL 18.69* 14.39*   Hemoglobin g/dL 9.0* 8.4*   Hemoglobin A1C %  --  5.7*   Hematocrit % 28.5* 26.1*   Platelets K/uL 309 280   Gran % % 76.4* 71.6   Lymph % % 15.8* 21.8   Mono % % 5.8 5.3   Eosinophil % % 1.0 0.6   Basophil % % 0.4 0.3   Differential Method  Automated Automated       Metabolic Panel (last 72 hours):  Recent Labs   Lab Result Units 12/24/21  2303 12/25/21  0235 12/25/21  0502 12/25/21  0503   Sodium mmol/L 138  --  137  --    Potassium mmol/L 4.3  --  4.4  --    Chloride mmol/L 107  --  108  --    CO2 mmol/L 22*  --  21*  --    Glucose mg/dL 154*  --  121*  --    BUN mg/dL 40*  --  33*  --    Creatinine mg/dL 0.75  --  0.7  --    Creatinine, Urine mg/dL  --  81.1  --   --    Albumin g/dL 3.3*  --  3.1*  --    Total Bilirubin mg/dL 0.2  --  0.3  --    Alkaline Phosphatase U/L 88  --  79  --    AST U/L 18  --  13  --    ALT U/L 18  --  14  --     Magnesium mg/dL  --   --   --  2.0   Phosphorus mg/dL  --   --   --  3.4       Drug levels (last 3 results):  No results for input(s): VANCOMYCINRA, VANCOMYCINPE, VANCOMYCINTR in the last 72 hours.    Microbiologic Results:  Microbiology Results (last 7 days)       Procedure Component Value Units Date/Time    Blood culture [[id number]] Collected: 12/25/21 0502    Order Status: Sent Specimen: Blood Updated: 12/25/21 0504    Blood culture [[id number]] Collected: 12/25/21 0502    Order Status: Sent Specimen: Blood Updated: 12/25/21 0504

## 2021-12-25 NOTE — ASSESSMENT & PLAN NOTE
Chronic daily heroin use  Drug screen panel positive for opiates  Will order HIV, acute hepatitis panel, and blood cultures  Consider Suboxone, will need further discussions with patient  Follow-up with Psychiatry recommendations

## 2021-12-25 NOTE — ASSESSMENT & PLAN NOTE
S/p 1 L Bolus NS and IV Protonix 40 mg in ED  One-month history of melanotic and bright red bloody stools. Patient denies any alcohol use/NSAID use but does endorse a chronic lifelong history of hepatitis-C infection since he was a teenager.    Plan:  [facility] consulted and will appreciate any recommendations  Keep NPO  IV Protonix 40mg Q12H  IV ceftriaxone 2g Q24H and IV metronidazole 500 mg Q8H given concern for sepsis  BP currently stable will consider IVF if needed  Will trend H&H Q4H  Follow-up Iron + TIBC, ferritin, folate, Vit B12 levels  If varices is suspected  - Octreotide 50mcg IV bolus x 1 then 50mcg/hr gtt   - Start empiric Abx (Ceftriaxone 1g IV q24) and perform diagnostic paracentesis when stable

## 2021-12-26 LAB
ABO + RH BLD: NORMAL
ALBUMIN SERPL BCP-MCNC: 3 G/DL (ref 3.5–5.2)
ALP SERPL-CCNC: 69 U/L (ref 55–135)
ALT SERPL W/O P-5'-P-CCNC: 14 U/L (ref 10–44)
ANION GAP SERPL CALC-SCNC: 8 MMOL/L (ref 8–16)
AST SERPL-CCNC: 12 U/L (ref 10–40)
BASOPHILS # BLD AUTO: 0.05 K/UL (ref 0–0.2)
BASOPHILS NFR BLD: 0.3 % (ref 0–1.9)
BILIRUB SERPL-MCNC: 0.3 MG/DL (ref 0.1–1)
BLD GP AB SCN CELLS X3 SERPL QL: NORMAL
BLD PROD TYP BPU: NORMAL
BLD PROD TYP BPU: NORMAL
BLOOD UNIT EXPIRATION DATE: NORMAL
BLOOD UNIT EXPIRATION DATE: NORMAL
BLOOD UNIT TYPE CODE: 9500
BLOOD UNIT TYPE CODE: 9500
BLOOD UNIT TYPE: NORMAL
BLOOD UNIT TYPE: NORMAL
BUN SERPL-MCNC: 15 MG/DL (ref 6–20)
CALCIUM SERPL-MCNC: 8.2 MG/DL (ref 8.7–10.5)
CHLORIDE SERPL-SCNC: 107 MMOL/L (ref 95–110)
CK SERPL-CCNC: 46 U/L (ref 20–200)
CO2 SERPL-SCNC: 22 MMOL/L (ref 23–29)
CODING SYSTEM: NORMAL
CODING SYSTEM: NORMAL
CREAT SERPL-MCNC: 0.8 MG/DL (ref 0.5–1.4)
DIFFERENTIAL METHOD: ABNORMAL
DISPENSE STATUS: NORMAL
DISPENSE STATUS: NORMAL
EOSINOPHIL # BLD AUTO: 0.1 K/UL (ref 0–0.5)
EOSINOPHIL NFR BLD: 0.6 % (ref 0–8)
ERYTHROCYTE [DISTWIDTH] IN BLOOD BY AUTOMATED COUNT: 14.6 % (ref 11.5–14.5)
EST. GFR  (AFRICAN AMERICAN): >60 ML/MIN/1.73 M^2
EST. GFR  (NON AFRICAN AMERICAN): >60 ML/MIN/1.73 M^2
GLUCOSE SERPL-MCNC: 109 MG/DL (ref 70–110)
HCT VFR BLD AUTO: 20.4 % (ref 40–54)
HCT VFR BLD AUTO: 20.5 % (ref 40–54)
HCT VFR BLD AUTO: 20.5 % (ref 40–54)
HCT VFR BLD AUTO: 23.1 % (ref 40–54)
HCT VFR BLD AUTO: 24.9 % (ref 40–54)
HGB BLD-MCNC: 6.6 G/DL (ref 14–18)
HGB BLD-MCNC: 6.7 G/DL (ref 14–18)
HGB BLD-MCNC: 6.7 G/DL (ref 14–18)
HGB BLD-MCNC: 7.6 G/DL (ref 14–18)
HGB BLD-MCNC: 8.3 G/DL (ref 14–18)
IMM GRANULOCYTES # BLD AUTO: 0.12 K/UL (ref 0–0.04)
IMM GRANULOCYTES NFR BLD AUTO: 0.7 % (ref 0–0.5)
LYMPHOCYTES # BLD AUTO: 3.6 K/UL (ref 1–4.8)
LYMPHOCYTES NFR BLD: 20.2 % (ref 18–48)
MCH RBC QN AUTO: 27.2 PG (ref 27–31)
MCHC RBC AUTO-ENTMCNC: 32.7 G/DL (ref 32–36)
MCV RBC AUTO: 83 FL (ref 82–98)
MONOCYTES # BLD AUTO: 1.3 K/UL (ref 0.3–1)
MONOCYTES NFR BLD: 7.6 % (ref 4–15)
NEUTROPHILS # BLD AUTO: 12.5 K/UL (ref 1.8–7.7)
NEUTROPHILS NFR BLD: 70.6 % (ref 38–73)
NRBC BLD-RTO: 0 /100 WBC
PLATELET # BLD AUTO: 221 K/UL (ref 150–450)
PMV BLD AUTO: 11.2 FL (ref 9.2–12.9)
POTASSIUM SERPL-SCNC: 3.5 MMOL/L (ref 3.5–5.1)
PROT SERPL-MCNC: 5.3 G/DL (ref 6–8.4)
RBC # BLD AUTO: 2.46 M/UL (ref 4.6–6.2)
SODIUM SERPL-SCNC: 137 MMOL/L (ref 136–145)
TRANS ERYTHROCYTES VOL PATIENT: NORMAL ML
TRANS ERYTHROCYTES VOL PATIENT: NORMAL ML
TROPONIN I SERPL DL<=0.01 NG/ML-MCNC: 0.02 NG/ML (ref 0–0.03)
VANCOMYCIN TROUGH SERPL-MCNC: 17.8 UG/ML (ref 10–22)
WBC # BLD AUTO: 17.6 K/UL (ref 3.9–12.7)

## 2021-12-26 PROCEDURE — 27200966 HC CLOSED SUCTION SYSTEM

## 2021-12-26 PROCEDURE — 86901 BLOOD TYPING SEROLOGIC RH(D): CPT | Performed by: FAMILY MEDICINE

## 2021-12-26 PROCEDURE — 80053 COMPREHEN METABOLIC PANEL: CPT | Performed by: STUDENT IN AN ORGANIZED HEALTH CARE EDUCATION/TRAINING PROGRAM

## 2021-12-26 PROCEDURE — 99291 PR CRITICAL CARE, E/M 30-74 MINUTES: ICD-10-PCS | Mod: GC,,, | Performed by: INTERNAL MEDICINE

## 2021-12-26 PROCEDURE — P9021 RED BLOOD CELLS UNIT: HCPCS | Performed by: FAMILY MEDICINE

## 2021-12-26 PROCEDURE — 63600175 PHARM REV CODE 636 W HCPCS: Performed by: STUDENT IN AN ORGANIZED HEALTH CARE EDUCATION/TRAINING PROGRAM

## 2021-12-26 PROCEDURE — 25000003 PHARM REV CODE 250: Performed by: FAMILY MEDICINE

## 2021-12-26 PROCEDURE — 36415 COLL VENOUS BLD VENIPUNCTURE: CPT | Performed by: STUDENT IN AN ORGANIZED HEALTH CARE EDUCATION/TRAINING PROGRAM

## 2021-12-26 PROCEDURE — 36415 COLL VENOUS BLD VENIPUNCTURE: CPT | Performed by: FAMILY MEDICINE

## 2021-12-26 PROCEDURE — 25000003 PHARM REV CODE 250: Performed by: STUDENT IN AN ORGANIZED HEALTH CARE EDUCATION/TRAINING PROGRAM

## 2021-12-26 PROCEDURE — 80202 ASSAY OF VANCOMYCIN: CPT | Performed by: FAMILY MEDICINE

## 2021-12-26 PROCEDURE — 84484 ASSAY OF TROPONIN QUANT: CPT | Performed by: STUDENT IN AN ORGANIZED HEALTH CARE EDUCATION/TRAINING PROGRAM

## 2021-12-26 PROCEDURE — 27000221 HC OXYGEN, UP TO 24 HOURS

## 2021-12-26 PROCEDURE — 94003 VENT MGMT INPAT SUBQ DAY: CPT

## 2021-12-26 PROCEDURE — 99900035 HC TECH TIME PER 15 MIN (STAT)

## 2021-12-26 PROCEDURE — 85014 HEMATOCRIT: CPT | Mod: 91 | Performed by: STUDENT IN AN ORGANIZED HEALTH CARE EDUCATION/TRAINING PROGRAM

## 2021-12-26 PROCEDURE — 85014 HEMATOCRIT: CPT | Performed by: STUDENT IN AN ORGANIZED HEALTH CARE EDUCATION/TRAINING PROGRAM

## 2021-12-26 PROCEDURE — 85025 COMPLETE CBC W/AUTO DIFF WBC: CPT | Performed by: STUDENT IN AN ORGANIZED HEALTH CARE EDUCATION/TRAINING PROGRAM

## 2021-12-26 PROCEDURE — 85018 HEMOGLOBIN: CPT | Mod: 91 | Performed by: STUDENT IN AN ORGANIZED HEALTH CARE EDUCATION/TRAINING PROGRAM

## 2021-12-26 PROCEDURE — 86920 COMPATIBILITY TEST SPIN: CPT | Performed by: FAMILY MEDICINE

## 2021-12-26 PROCEDURE — 20000000 HC ICU ROOM

## 2021-12-26 PROCEDURE — 63600175 PHARM REV CODE 636 W HCPCS: Performed by: FAMILY MEDICINE

## 2021-12-26 PROCEDURE — C9113 INJ PANTOPRAZOLE SODIUM, VIA: HCPCS | Performed by: STUDENT IN AN ORGANIZED HEALTH CARE EDUCATION/TRAINING PROGRAM

## 2021-12-26 PROCEDURE — 85018 HEMOGLOBIN: CPT | Performed by: STUDENT IN AN ORGANIZED HEALTH CARE EDUCATION/TRAINING PROGRAM

## 2021-12-26 PROCEDURE — 86900 BLOOD TYPING SEROLOGIC ABO: CPT | Performed by: FAMILY MEDICINE

## 2021-12-26 PROCEDURE — 36430 TRANSFUSION BLD/BLD COMPNT: CPT

## 2021-12-26 PROCEDURE — 82550 ASSAY OF CK (CPK): CPT | Performed by: STUDENT IN AN ORGANIZED HEALTH CARE EDUCATION/TRAINING PROGRAM

## 2021-12-26 PROCEDURE — 99291 CRITICAL CARE FIRST HOUR: CPT | Mod: GC,,, | Performed by: INTERNAL MEDICINE

## 2021-12-26 RX ORDER — POTASSIUM CHLORIDE 7.45 MG/ML
10 INJECTION INTRAVENOUS
Status: COMPLETED | OUTPATIENT
Start: 2021-12-26 | End: 2021-12-26

## 2021-12-26 RX ORDER — MUPIROCIN 20 MG/G
OINTMENT TOPICAL 2 TIMES DAILY
Status: DISCONTINUED | OUTPATIENT
Start: 2021-12-26 | End: 2021-12-28 | Stop reason: HOSPADM

## 2021-12-26 RX ORDER — MAGNESIUM SULFATE 1 G/100ML
1 INJECTION INTRAVENOUS
Status: COMPLETED | OUTPATIENT
Start: 2021-12-26 | End: 2021-12-26

## 2021-12-26 RX ORDER — POTASSIUM CHLORIDE 7.45 MG/ML
10 INJECTION INTRAVENOUS 4 TIMES DAILY
Status: DISCONTINUED | OUTPATIENT
Start: 2021-12-26 | End: 2021-12-26

## 2021-12-26 RX ORDER — HYDROCODONE BITARTRATE AND ACETAMINOPHEN 500; 5 MG/1; MG/1
TABLET ORAL
Status: DISCONTINUED | OUTPATIENT
Start: 2021-12-26 | End: 2021-12-28 | Stop reason: HOSPADM

## 2021-12-26 RX ADMIN — MIDAZOLAM HYDROCHLORIDE 20 MG/HR: 5 INJECTION, SOLUTION INTRAMUSCULAR; INTRAVENOUS at 10:12

## 2021-12-26 RX ADMIN — PIPERACILLIN AND TAZOBACTAM 4.5 G: 4; .5 INJECTION, POWDER, LYOPHILIZED, FOR SOLUTION INTRAVENOUS; PARENTERAL at 04:12

## 2021-12-26 RX ADMIN — Medication 300 MCG/HR: at 05:12

## 2021-12-26 RX ADMIN — POTASSIUM CHLORIDE 10 MEQ: 7.46 INJECTION, SOLUTION INTRAVENOUS at 02:12

## 2021-12-26 RX ADMIN — MUPIROCIN: 20 OINTMENT TOPICAL at 10:12

## 2021-12-26 RX ADMIN — MUPIROCIN: 20 OINTMENT TOPICAL at 09:12

## 2021-12-26 RX ADMIN — VANCOMYCIN HYDROCHLORIDE 1750 MG: 10 INJECTION, POWDER, LYOPHILIZED, FOR SOLUTION INTRAVENOUS at 12:12

## 2021-12-26 RX ADMIN — PANTOPRAZOLE SODIUM 40 MG: 40 INJECTION, POWDER, LYOPHILIZED, FOR SOLUTION INTRAVENOUS at 10:12

## 2021-12-26 RX ADMIN — PANTOPRAZOLE SODIUM 40 MG: 40 INJECTION, POWDER, LYOPHILIZED, FOR SOLUTION INTRAVENOUS at 09:12

## 2021-12-26 RX ADMIN — POTASSIUM CHLORIDE 10 MEQ: 7.46 INJECTION, SOLUTION INTRAVENOUS at 03:12

## 2021-12-26 RX ADMIN — PIPERACILLIN AND TAZOBACTAM 4.5 G: 4; .5 INJECTION, POWDER, LYOPHILIZED, FOR SOLUTION INTRAVENOUS; PARENTERAL at 02:12

## 2021-12-26 RX ADMIN — Medication 250 MCG/HR: at 09:12

## 2021-12-26 RX ADMIN — VANCOMYCIN HYDROCHLORIDE 1750 MG: 10 INJECTION, POWDER, LYOPHILIZED, FOR SOLUTION INTRAVENOUS at 10:12

## 2021-12-26 RX ADMIN — KETAMINE HYDROCHLORIDE 17.5 MCG/KG/MIN: 50 INJECTION INTRAMUSCULAR; INTRAVENOUS at 04:12

## 2021-12-26 RX ADMIN — POTASSIUM CHLORIDE 10 MEQ: 7.46 INJECTION, SOLUTION INTRAVENOUS at 01:12

## 2021-12-26 RX ADMIN — MAGNESIUM SULFATE 1 G: 1 INJECTION INTRAVENOUS at 03:12

## 2021-12-26 RX ADMIN — MIDAZOLAM HYDROCHLORIDE 20 MG/HR: 5 INJECTION, SOLUTION INTRAMUSCULAR; INTRAVENOUS at 06:12

## 2021-12-26 RX ADMIN — POTASSIUM CHLORIDE 10 MEQ: 7.46 INJECTION, SOLUTION INTRAVENOUS at 04:12

## 2021-12-26 RX ADMIN — MIDAZOLAM HYDROCHLORIDE 20 MG/HR: 5 INJECTION, SOLUTION INTRAMUSCULAR; INTRAVENOUS at 01:12

## 2021-12-26 RX ADMIN — KETAMINE HYDROCHLORIDE 17 MCG/KG/MIN: 50 INJECTION INTRAMUSCULAR; INTRAVENOUS at 10:12

## 2021-12-26 RX ADMIN — MAGNESIUM SULFATE 1 G: 1 INJECTION INTRAVENOUS at 02:12

## 2021-12-26 RX ADMIN — KETAMINE HYDROCHLORIDE 17.5 MCG/KG/MIN: 50 INJECTION INTRAMUSCULAR; INTRAVENOUS at 10:12

## 2021-12-26 RX ADMIN — PIPERACILLIN AND TAZOBACTAM 4.5 G: 4; .5 INJECTION, POWDER, LYOPHILIZED, FOR SOLUTION INTRAVENOUS; PARENTERAL at 10:12

## 2021-12-26 RX ADMIN — SODIUM CHLORIDE: 0.9 INJECTION, SOLUTION INTRAVENOUS at 02:12

## 2021-12-26 NOTE — PLAN OF CARE
Patient on vent with documented settings.  Alarms are set and functioning with adequate volumes.  AMBU bag and mask at bedside.

## 2021-12-26 NOTE — NURSING
Bedside rounds with critical care team and [facility], asked about potassium replacement for K of 3.5 see new orders, asked about goal RASS -4 ok, and clarified need for CT head stil, ok to take down once new bags of sedation hung, no other issues at this time, resting calmly in bed, VSS, no issues with blood infusion, no injury from bltrl soft wrist restraints, side rails up, monitoring closley.

## 2021-12-26 NOTE — PROGRESS NOTES
Pulmonology/Critical care Progress Note:    Last 24 hours: significant agitated delirium requiring intubation and sedation for safety. Continues on fentanyl, ketamine and versed drips, vitals improved. Hb still trending down; pending pRBC admin today.     Objective:  Vitals: reviewed  General: sedated and comfortable on exam  HEENT: nonicteric, MMM, no JVD  CV: tachycardic, no murmurs, no edema  Resp: CTAB, tachypneic, no wheezing or crackles  Abd: soft, NTTP  Extr: no cyanosis or edema, no clubbing  Neuro: as above- sedated  24hr Intake/Output: 630/1L  Lines and tubes: PIV     Labs: Impression: leukocytosis improving, Hb drifting down still. No EH.      Imaging: CXR 12/25 wnl, ETT in place     Echo: 12/25:  Normal systolic function.  The estimated ejection fraction is 65%.  Normal left ventricular diastolic function.  Normal right ventricular size with normal right ventricular systolic function.  Normal central venous pressure (3 mmHg).     Assessment:  1. Agitated delirium requiring intubation and sedation  2. GI bleed s/p intervention  3. Polysubstance use/withdrawal     Plan/Recommendations:  Neuro:  #agitated delirium: now on ketamine, fentanyl and versed drips, pending SAT  #polysubstance use: unclear toxidrome, appears sympathomimetic from meth vs PCP. Tox + for opiates and amphetamines. Would avoid suboxone admin for now until completed withdrawal and outpatient.      Cards:  #HTN, tachycardia: related to toxidrome above, improved once well-sedated  #Trop elevation to 0.05, likely from stress response, continue to trend     Resp:  #Intubation for airway protection: continue LPMV with 6cc/kg IBW, PPlat <30  Vent day and settings: day 2, AC/VC 22/480/5/40%  Blood gases: 7.43/35/138     FEN/GI:  NPO for now, has received sufficient fluids  K>4, Mg>2, Phos>3  Chronic HCV, HBV, no hx cirrhosis  #Acute GI bleed s/p intervention: continue PPI IV BID, continue CBC q8, transfuse for <7 or in shock, pending pRBCs  today     Renal: no acute issues  Cr: 0.7-> 0.8     Endo:  Low- normal TSH, no hx hyperthyroidism  Glucose checks q6, Hb 5.7     Heme/ID:  No acute issues, does not appear acutely infected; would deescalate  Abx and day: vanc/zosyn  day 2  Cultures: Bcx NGTD     Gi PPX: PPI IV BID  DVT ppx: SCDs, acute GI bleed  Code status: FULL  Family: Keely, patient's sister is at bedside and updated     Thank you for the consult. We will continue to follow along with you. Discussed with attending Dr. Jamarcus Edmond MD  [facility] fellow    Pulmonary/Critical Care Attending with Dr. Edmond    Patient seen and personally examined on rounds with Dr. Edmond after review of ON hospital course, labs and xrays.  I have reviewed, we discussed, and I have verified her findings, assessment and recommendations.  The patient remains intubated on the vent as he required significant sedation yesterday evening.  This am he was sedated in bed on the vent.  He had an endoscopy yesterday after having an episode of BRBPR ON found to be an ulcer.  It was cauterized at that time.  Today his hemoglobin is gradually going down.  There is concern as he has substance (heroin and meth) abuse.  He was also found to have H. pylori, and an antigen test will be done by the [facility] team.  Continue care at present and will assess for SBT readiness daily.  Critical care time 35 min for review of chart, lab data and images, examination of the patient, monitoring, and adjusting as necessary the management plan for support of vital organ system (respiratory/GI), and also for discussion with other caregivers.    Ellen Ricketts MD  12/26/2021  11:32 AM

## 2021-12-26 NOTE — PROGRESS NOTES
"[facility]    Subjective:  Patient had worsening agitation after endoscopy yesterday requiring intubation and sedation with midazolam, fentanyl and ketamine. He did not have any recurrent melena per nursing staff.     Past Medical History  Past Medical History:   Diagnosis Date    Bipolar 1 disorder     Chronic hepatitis C 12/25/2021    COPD (chronic obstructive pulmonary disease)     Encounter for blood transfusion    IV drug use (heroin)    Review of Systems - Unable to obtain (intubated and sedated)    Physical Examination  BP (!) 158/87   Pulse 95   Temp 100.1 °F (37.8 °C) (Axillary)   Resp (!) 26   Ht 6' 2" (1.88 m)   Wt 97 kg (213 lb 13.5 oz)   SpO2 100%   BMI 27.46 kg/m²   General appearance: Intubated and sedated.   Heart: Tachycardic with regular rhythm without rub; no displacement of the PMI   Abdomen: soft, non-distended, non-tender bowel sounds normoactive; no organomegaly      Assessment:   56 y.o. male with history of IVDU (heroin), COPD, Bipolar disorder and chronic HCV who presented with hematochezia with associated acute, symptomatic iron-deficiency anemia. EGD on 12/25 revealed a large, cratered ulcer in the duodenal bulb with a non-bleeding visible vessel and pigmented spot treated with epinephrine injection and bipolar electrocautery with no bleeding on completion of the procedure.    Plan:   - Trend CBC, transfuse PRBCs for hemoglobin <7  - Follow-up HCV RNA viral load   - Would obtain H. Pylori stool antigen; treat for eradication if positive   - Consider IV iron supplementation while inpatient given iron-deficiency  - IV PPI BID for 72 hours; will need oral PPI BID for 8-week course  - Avoid all NSAIDs on discharge     Case discussed with Dr. Hong.     Garrison Casillas MD  [facility] Fellow, PGY-4  "

## 2021-12-26 NOTE — NURSING
Stopped blood and flushed line, transport at [facility] and RT at bedside to take pt down for CT head, all infusions have adequate volume and pt is calm, four point restraints are on without injury, transported via bed down to CT, no issues during test, arrived back in unit at approx 1136 placed back onto ICU monitors, bathed and changed gown and linen, dent care done, oral care provided, VSS, sister now at [facility], Dr. Chappell came to bedside at approx 1250 and updated sister on plan of care, all side rails up, bed lowest to floor wheels locked, room door open, alarms on and audible, montioring julieth.

## 2021-12-26 NOTE — PROGRESS NOTES
"Progress Note  U Pulmonary & Critical Care Medicine    Attending: Ray   Admit Date: 12/24/2021  Today's Date: 12/26/2021    HPI:  Boo Romano is a 56-year-old PMHx COPD, bipolar 1 disorder, chronic hepatitis C, IVDU (heroin) who presented to [facility] ED with complaint of bloody bowel movements and witnessed syncopal episode. Endorses bowel movements are black with mixed bright red blood. Endorses a 1 month history of bloody bowel movements but acute worsening yesterday. Patient was found by significant other with a witnessed syncopal episode while laying on sofa. Patient endorses associated mild epigastric pain, diffuse body weakness, skin pallor, and dizziness. Tried taking Pepto-Bismol at home for the diarrhea and abdominal pain but was provided little relief. Endorses pulling "large worms" out of his stools around 2 feet in length. Admits to daily IV heroin use due to chronic low back pain. Smokes 1/2 pack of cigarettes per day. Denies any alcohol use, chronic NSAID use, nausea/vomiting, abdominal distension, fever/chills.      In [facility] ED, patient hypotensive BP 97/62. CBC with anemia (hemoglobin 9.0) and leukocytosis (WBC count 18.69). Was provided 1 L bolus NS and IV Protonix 40 mg in ED. GI was consulted and patient was admitted to [facility] for GI bleed.    SUBJECTIVE:     Yesterday prior to EGD pt was given one dose of suboxne 8mg as per reports of increased anxiety and possible withdrawals. GI took for EGD showed normal esophogus and stomach, but had single non bleeding large crater ulcer in duod. Bulb which was tx'd with epi and bipolar coagulation. When he woke he was more agitated and unable to follow commands as well as kicking so moved upstairs for intubation/sedation. Got haldol and tried precedex, fentanyl, and versed drips w/ both precedex and fentanyl maxed. He was switched from precedex to ketamine at that point. GI saw him again this AM but no change in recs. "     Patient seen and examined this morning.     Review of Systems   Unable to perform ROS: Intubated       OBJECTIVE:     Vital Signs Trends/Hx Reviewed  Vitals:    12/25/21 2156 12/25/21 2355 12/26/21 0114 12/26/21 0412   BP:       BP Location:       Patient Position:       Pulse: 98 93 87 95   Resp: (!) 27 (!) 22 (!) 22 (!) 26   Temp:       TempSrc:       SpO2: 100% 100% 100% 100%   Weight:       Height:           Vent Mode: A/CMV-VC  Oxygen Concentration (%):  [40] 40  Resp Rate Total:  [22 br/min-38 br/min] 22 br/min  Vt Set:  [480 mL] 480 mL  PEEP/CPAP:  [5 cmH20] 5 cmH20  Mean Airway Pressure:  [5.5 cmH20-8.6 cmH20] 8.6 cmH20    480 flow 60 RR 22 PEEP 5 FiO2 40     Physical Exam  Vitals and nursing note reviewed.   Constitutional:       General: He is not in acute distress.     Appearance: He is not ill-appearing or toxic-appearing.   HENT:      Head: Normocephalic and atraumatic.      Right Ear: External ear normal.      Left Ear: External ear normal.      Nose: Nose normal.   Eyes:      General: No scleral icterus.     Conjunctiva/sclera: Conjunctivae normal.   Cardiovascular:      Rate and Rhythm: Normal rate and regular rhythm.      Pulses: Normal pulses.      Heart sounds: Normal heart sounds.   Pulmonary:      Comments: Intubated breathing with vent  Abdominal:      General: Bowel sounds are normal.      Palpations: Abdomen is soft. There is no mass.      Tenderness: There is no abdominal tenderness.   Musculoskeletal:         General: No swelling.      Right lower leg: No edema.      Left lower leg: No edema.   Lymphadenopathy:      Cervical: No cervical adenopathy.   Skin:     General: Skin is warm and dry.      Findings: No rash.       Laboratory:  Recent Labs   Lab 12/26/21  0405   WBC 17.60*   RBC 2.46*   HGB 6.7*  6.7*   HCT 20.5*  20.5*      MCV 83   MCH 27.2   MCHC 32.7     Recent Labs   Lab 12/25/21  1559 12/25/21  1559 12/26/21  0405      < > 137   K 4.2   < > 3.5      < >  107   CO2 19*   < > 22*   BUN 22*   < > 15   CREATININE 0.8   < > 0.8   MG 1.8  --   --     < > = values in this interval not displayed.     Recent Labs   Lab 12/25/21  1544   PH 7.430   PCO2 35.7   PO2 138*   HCO3 23.7*   POCSATURATED 99   BE -1         Chest Imaging:   No new chest imaging.     ASSESSMENT & RECOMMENDATIONS     Neuro/Psych  -Intubated and sedated on ketamine, fentanyl, and versed   HX of polysubstance abuse  -Utox was pos for opiates and amphetamines  -Received 1 dose 8mg suboxone yesterday prior to anesthesia for withdrawals  -Will avoid suboxone at this time.     CV  HTN which was likely from drug use   -BP this AM while in room was 177/90  -Not on any pressers  Elevated troponin 0.041 on admit and trending last 0.054  -EKG not uploaded  -ECHO yesterday w/ normal LVSF and EF 65%     Pulm  Intubated for airway protection  Vent settings this AM as above   Most recent ABG from yesterday pH 7.4 PCO2 35.7 Bicarb 23.7 PO2 138  No new CXR from yest and no acute cardiopulm seen  Wean vent settings as tolerated and SBT once appropriate    FEN/GI  NPO  Electrolytes wnl  GI did EGD yesterday rec cnt IV PPI BID then oral for 8 days    RENAL  UOP: 2450cc , In: 630cc, net -1819cc in last 24 hrs    BUN/Cr: 15/0.8  Continue to monitor renal status and urine output     Heme  H/H 7.5/26 on admit but trended down throughout yest and is 6.7/20 this AM will transfuse 1 unit   WBC 17.6 from 18 yest  Folate and B12 wnl  GI rec Fe studies   -Fe and Fe sat low 39 and 12 Transferrin and TIBC wnl 218 and 323  -Ferritin wnl 32  DVT prophylaxis: SCDs    Endo  TSH on admit wnl but low 0.427   A1C 5.7   Glucose checks q6hr    ID  WBC 18 on admit and 17.6 AM  On Vanc Zosyn day 2   Bld cx's from admit w/ NGTD  Hep panel, Hep C RNA, HIV, Stool ova parasite pending    Stephen Turner M.D.  [facility] Pulmonary/Critical Care   12/26/2021 8:20 AM

## 2021-12-26 NOTE — NURSING
Spoke to pt's sister and she requested that only herself and Crystal be allowed to get information and visit, I informed her that we cannot grantee that other visors will be here be she is more than welcome to stay at bedside to make sure and we will do our best to inform any other visitors to check with her first but no grantee can be made. Gave pt's wallet and pocket knife to Sister.

## 2021-12-26 NOTE — PLAN OF CARE
Pt intubated and sedated, critical care team is aware of the rate for sedation medications, educated pt's sister who came to bedside today about plan of care, Dr. Chappell also spoke with her in person today, no injury form restraints, VSS, monitoring closely for signs or symptoms of opioid withdrawal.

## 2021-12-27 LAB
ALBUMIN SERPL BCP-MCNC: 3.1 G/DL (ref 3.5–5.2)
ALP SERPL-CCNC: 76 U/L (ref 55–135)
ALT SERPL W/O P-5'-P-CCNC: 13 U/L (ref 10–44)
ANION GAP SERPL CALC-SCNC: 9 MMOL/L (ref 8–16)
AST SERPL-CCNC: 11 U/L (ref 10–40)
BASOPHILS # BLD AUTO: 0.04 K/UL (ref 0–0.2)
BASOPHILS NFR BLD: 0.3 % (ref 0–1.9)
BILIRUB SERPL-MCNC: 0.3 MG/DL (ref 0.1–1)
BUN SERPL-MCNC: 7 MG/DL (ref 6–20)
CALCIUM SERPL-MCNC: 8.5 MG/DL (ref 8.7–10.5)
CHLORIDE SERPL-SCNC: 102 MMOL/L (ref 95–110)
CO2 SERPL-SCNC: 21 MMOL/L (ref 23–29)
CREAT SERPL-MCNC: 0.8 MG/DL (ref 0.5–1.4)
DIFFERENTIAL METHOD: ABNORMAL
EOSINOPHIL # BLD AUTO: 0.1 K/UL (ref 0–0.5)
EOSINOPHIL NFR BLD: 0.8 % (ref 0–8)
ERYTHROCYTE [DISTWIDTH] IN BLOOD BY AUTOMATED COUNT: 14.9 % (ref 11.5–14.5)
EST. GFR  (AFRICAN AMERICAN): >60 ML/MIN/1.73 M^2
EST. GFR  (NON AFRICAN AMERICAN): >60 ML/MIN/1.73 M^2
GLUCOSE SERPL-MCNC: 100 MG/DL (ref 70–110)
HAV IGM SERPL QL IA: NEGATIVE
HBV CORE IGM SERPL QL IA: NEGATIVE
HBV SURFACE AG SERPL QL IA: NEGATIVE
HCT VFR BLD AUTO: 24.3 % (ref 40–54)
HCT VFR BLD AUTO: 24.7 % (ref 40–54)
HCT VFR BLD AUTO: 26.2 % (ref 40–54)
HCV AB SERPL QL IA: POSITIVE
HGB BLD-MCNC: 7.9 G/DL (ref 14–18)
HGB BLD-MCNC: 8 G/DL (ref 14–18)
HGB BLD-MCNC: 8.5 G/DL (ref 14–18)
HIV 1+2 AB+HIV1 P24 AG SERPL QL IA: NEGATIVE
IMM GRANULOCYTES # BLD AUTO: 0.06 K/UL (ref 0–0.04)
IMM GRANULOCYTES NFR BLD AUTO: 0.4 % (ref 0–0.5)
LYMPHOCYTES # BLD AUTO: 2.9 K/UL (ref 1–4.8)
LYMPHOCYTES NFR BLD: 18.6 % (ref 18–48)
MCH RBC QN AUTO: 27.1 PG (ref 27–31)
MCHC RBC AUTO-ENTMCNC: 32.5 G/DL (ref 32–36)
MCV RBC AUTO: 83 FL (ref 82–98)
MONOCYTES # BLD AUTO: 1.3 K/UL (ref 0.3–1)
MONOCYTES NFR BLD: 8.3 % (ref 4–15)
NEUTROPHILS # BLD AUTO: 11.3 K/UL (ref 1.8–7.7)
NEUTROPHILS NFR BLD: 71.6 % (ref 38–73)
NRBC BLD-RTO: 0 /100 WBC
PLATELET # BLD AUTO: 258 K/UL (ref 150–450)
PMV BLD AUTO: 11 FL (ref 9.2–12.9)
POTASSIUM SERPL-SCNC: 3.8 MMOL/L (ref 3.5–5.1)
PROT SERPL-MCNC: 5.9 G/DL (ref 6–8.4)
RBC # BLD AUTO: 2.92 M/UL (ref 4.6–6.2)
SODIUM SERPL-SCNC: 132 MMOL/L (ref 136–145)
WBC # BLD AUTO: 15.8 K/UL (ref 3.9–12.7)

## 2021-12-27 PROCEDURE — 94761 N-INVAS EAR/PLS OXIMETRY MLT: CPT

## 2021-12-27 PROCEDURE — C9113 INJ PANTOPRAZOLE SODIUM, VIA: HCPCS | Performed by: STUDENT IN AN ORGANIZED HEALTH CARE EDUCATION/TRAINING PROGRAM

## 2021-12-27 PROCEDURE — 87070 CULTURE OTHR SPECIMN AEROBIC: CPT | Performed by: FAMILY MEDICINE

## 2021-12-27 PROCEDURE — 63600175 PHARM REV CODE 636 W HCPCS: Performed by: STUDENT IN AN ORGANIZED HEALTH CARE EDUCATION/TRAINING PROGRAM

## 2021-12-27 PROCEDURE — 36415 COLL VENOUS BLD VENIPUNCTURE: CPT | Performed by: STUDENT IN AN ORGANIZED HEALTH CARE EDUCATION/TRAINING PROGRAM

## 2021-12-27 PROCEDURE — 25000003 PHARM REV CODE 250: Performed by: STUDENT IN AN ORGANIZED HEALTH CARE EDUCATION/TRAINING PROGRAM

## 2021-12-27 PROCEDURE — 25000003 PHARM REV CODE 250: Performed by: FAMILY MEDICINE

## 2021-12-27 PROCEDURE — 85025 COMPLETE CBC W/AUTO DIFF WBC: CPT | Performed by: STUDENT IN AN ORGANIZED HEALTH CARE EDUCATION/TRAINING PROGRAM

## 2021-12-27 PROCEDURE — 20000000 HC ICU ROOM

## 2021-12-27 PROCEDURE — 87040 BLOOD CULTURE FOR BACTERIA: CPT | Performed by: FAMILY MEDICINE

## 2021-12-27 PROCEDURE — 99900035 HC TECH TIME PER 15 MIN (STAT)

## 2021-12-27 PROCEDURE — 99232 PR SUBSEQUENT HOSPITAL CARE,LEVL II: ICD-10-PCS | Mod: GC,,, | Performed by: INTERNAL MEDICINE

## 2021-12-27 PROCEDURE — 87205 SMEAR GRAM STAIN: CPT | Performed by: FAMILY MEDICINE

## 2021-12-27 PROCEDURE — 99291 PR CRITICAL CARE, E/M 30-74 MINUTES: ICD-10-PCS | Mod: GC,,, | Performed by: INTERNAL MEDICINE

## 2021-12-27 PROCEDURE — 80053 COMPREHEN METABOLIC PANEL: CPT | Performed by: STUDENT IN AN ORGANIZED HEALTH CARE EDUCATION/TRAINING PROGRAM

## 2021-12-27 PROCEDURE — 27000221 HC OXYGEN, UP TO 24 HOURS

## 2021-12-27 PROCEDURE — 94003 VENT MGMT INPAT SUBQ DAY: CPT

## 2021-12-27 PROCEDURE — 36415 COLL VENOUS BLD VENIPUNCTURE: CPT | Performed by: FAMILY MEDICINE

## 2021-12-27 PROCEDURE — 99291 CRITICAL CARE FIRST HOUR: CPT | Mod: GC,,, | Performed by: INTERNAL MEDICINE

## 2021-12-27 PROCEDURE — 63600175 PHARM REV CODE 636 W HCPCS: Performed by: FAMILY MEDICINE

## 2021-12-27 PROCEDURE — 85014 HEMATOCRIT: CPT | Performed by: STUDENT IN AN ORGANIZED HEALTH CARE EDUCATION/TRAINING PROGRAM

## 2021-12-27 PROCEDURE — 99232 SBSQ HOSP IP/OBS MODERATE 35: CPT | Mod: GC,,, | Performed by: INTERNAL MEDICINE

## 2021-12-27 PROCEDURE — 85018 HEMOGLOBIN: CPT | Performed by: STUDENT IN AN ORGANIZED HEALTH CARE EDUCATION/TRAINING PROGRAM

## 2021-12-27 RX ORDER — SODIUM CHLORIDE FOR INHALATION 3 %
4 VIAL, NEBULIZER (ML) INHALATION
Status: DISCONTINUED | OUTPATIENT
Start: 2021-12-27 | End: 2021-12-28 | Stop reason: HOSPADM

## 2021-12-27 RX ORDER — ACETAMINOPHEN 650 MG/20.3ML
650 LIQUID ORAL EVERY 6 HOURS PRN
Status: DISCONTINUED | OUTPATIENT
Start: 2021-12-27 | End: 2021-12-28 | Stop reason: HOSPADM

## 2021-12-27 RX ORDER — POTASSIUM CHLORIDE 7.45 MG/ML
10 INJECTION INTRAVENOUS
Status: DISPENSED | OUTPATIENT
Start: 2021-12-27 | End: 2021-12-27

## 2021-12-27 RX ORDER — HYDRALAZINE HYDROCHLORIDE 20 MG/ML
10 INJECTION INTRAMUSCULAR; INTRAVENOUS EVERY 6 HOURS PRN
Status: DISCONTINUED | OUTPATIENT
Start: 2021-12-27 | End: 2021-12-28 | Stop reason: HOSPADM

## 2021-12-27 RX ORDER — ALBUTEROL SULFATE 2.5 MG/.5ML
2.5 SOLUTION RESPIRATORY (INHALATION)
Status: DISCONTINUED | OUTPATIENT
Start: 2021-12-27 | End: 2021-12-28 | Stop reason: HOSPADM

## 2021-12-27 RX ADMIN — PANTOPRAZOLE SODIUM 40 MG: 40 INJECTION, POWDER, LYOPHILIZED, FOR SOLUTION INTRAVENOUS at 09:12

## 2021-12-27 RX ADMIN — MUPIROCIN: 20 OINTMENT TOPICAL at 09:12

## 2021-12-27 RX ADMIN — MIDAZOLAM HYDROCHLORIDE 17 MG/HR: 5 INJECTION, SOLUTION INTRAMUSCULAR; INTRAVENOUS at 07:12

## 2021-12-27 RX ADMIN — IRON SUCROSE 200 MG: 20 INJECTION, SOLUTION INTRAVENOUS at 10:12

## 2021-12-27 RX ADMIN — Medication 300 MCG/HR: at 02:12

## 2021-12-27 RX ADMIN — VANCOMYCIN HYDROCHLORIDE 1750 MG: 10 INJECTION, POWDER, LYOPHILIZED, FOR SOLUTION INTRAVENOUS at 02:12

## 2021-12-27 RX ADMIN — ACETAMINOPHEN 650 MG: 160 SOLUTION ORAL at 12:12

## 2021-12-27 RX ADMIN — POTASSIUM CHLORIDE 10 MEQ: 7.46 INJECTION, SOLUTION INTRAVENOUS at 12:12

## 2021-12-27 RX ADMIN — PIPERACILLIN AND TAZOBACTAM 4.5 G: 4; .5 INJECTION, POWDER, LYOPHILIZED, FOR SOLUTION INTRAVENOUS; PARENTERAL at 09:12

## 2021-12-27 RX ADMIN — KETAMINE HYDROCHLORIDE 15.6 MCG/KG/MIN: 50 INJECTION INTRAMUSCULAR; INTRAVENOUS at 05:12

## 2021-12-27 RX ADMIN — MIDAZOLAM HYDROCHLORIDE 17 MG/HR: 5 INJECTION, SOLUTION INTRAMUSCULAR; INTRAVENOUS at 12:12

## 2021-12-27 RX ADMIN — Medication 295 MCG/HR: at 12:12

## 2021-12-27 RX ADMIN — MIDAZOLAM HYDROCHLORIDE 17 MG/HR: 5 INJECTION, SOLUTION INTRAMUSCULAR; INTRAVENOUS at 01:12

## 2021-12-27 RX ADMIN — PIPERACILLIN AND TAZOBACTAM 4.5 G: 4; .5 INJECTION, POWDER, LYOPHILIZED, FOR SOLUTION INTRAVENOUS; PARENTERAL at 05:12

## 2021-12-27 RX ADMIN — ACETAMINOPHEN 650 MG: 160 SOLUTION ORAL at 05:12

## 2021-12-27 RX ADMIN — PIPERACILLIN AND TAZOBACTAM 4.5 G: 4; .5 INJECTION, POWDER, LYOPHILIZED, FOR SOLUTION INTRAVENOUS; PARENTERAL at 02:12

## 2021-12-27 RX ADMIN — KETAMINE HYDROCHLORIDE 17 MCG/KG/MIN: 50 INJECTION INTRAMUSCULAR; INTRAVENOUS at 04:12

## 2021-12-27 RX ADMIN — KETAMINE HYDROCHLORIDE 17 MCG/KG/MIN: 50 INJECTION INTRAMUSCULAR; INTRAVENOUS at 11:12

## 2021-12-27 RX ADMIN — POTASSIUM CHLORIDE 10 MEQ: 7.46 INJECTION, SOLUTION INTRAVENOUS at 10:12

## 2021-12-27 RX ADMIN — Medication 300 MCG/HR: at 09:12

## 2021-12-27 RX ADMIN — KETAMINE HYDROCHLORIDE 16 MCG/KG/MIN: 50 INJECTION INTRAMUSCULAR; INTRAVENOUS at 10:12

## 2021-12-27 RX ADMIN — MIDAZOLAM HYDROCHLORIDE 20 MG/HR: 5 INJECTION, SOLUTION INTRAMUSCULAR; INTRAVENOUS at 07:12

## 2021-12-27 NOTE — [employer] PATIENT INSTRUCTIONS
Discharge Summary/Instructions after an Endoscopic Procedure  Patient Name: Boo Romano  Patient MRN: [id number]  Patient YOB: 1965 Saturday, December 25, 2021  Scotty Thomas MD  Dear patient,  As a result of recent federal legislation (The Federal Cures Act), you may   receive lab or pathology results from your procedure in your [facility]   account before your physician is able to contact you. Your physician or   their representative will relay the results to you with their   recommendations at their soonest availability.  Thank you,  Your health is very important to us during the Covid Crisis. Following your   procedure today, you will receive a daily text for 2 weeks asking about   signs or symptoms of Covid 19.  Please respond to this text when you   receive it so we can follow up and keep you as safe as possible.   RESTRICTIONS:  During your procedure today, you received medications for sedation.  These   medications may affect your judgment, balance and coordination.  Therefore,   for 24 hours, you have the following restrictions:   - DO NOT drive a car, operate machinery, make legal/financial decisions,   sign important papers or drink alcohol.    ACTIVITY:  Today: no heavy lifting, straining or running due to procedural   sedation/anesthesia.  The following day: return to full activity including work.  DIET:  Eat and drink normally unless instructed otherwise.     TREATMENT FOR COMMON SIDE EFFECTS:  - Mild abdominal pain, nausea, belching, bloating or excessive gas:  rest,   eat lightly and use a heating pad.  - Sore Throat: treat with throat lozenges and/or gargle with warm salt   water.  - Because air was used during the procedure, expelling large amounts of air   from your rectum or belching is normal.  - If a bowel prep was taken, you may not have a bowel movement for 1-3 days.    This is normal.  SYMPTOMS TO WATCH FOR AND REPORT TO YOUR PHYSICIAN:  1. Abdominal pain or bloating, other  than gas cramps.  2. Chest pain.  3. Back pain.  4. Signs of infection such as: chills or fever occurring within 24 hours   after the procedure.  5. Rectal bleeding, which would show as bright red, maroon, or black stools.   (A tablespoon of blood from the rectum is not serious, especially if   hemorrhoids are present.)  6. Vomiting.  7. Weakness or dizziness.  GO DIRECTLY TO THE NEAREST EMERGENCY ROOM IF YOU HAVE ANY OF THE FOLLOWING:      Difficulty breathing              Chills and/or fever over 101 F   Persistent vomiting and/or vomiting blood   Severe abdominal pain   Severe chest pain   Black, tarry stools   Bleeding- more than one tablespoon   Any other symptom or condition that you feel may need urgent attention  Your doctor recommends these additional instructions:  If any biopsies were taken, your doctors clinic will contact you in 1 to 2   weeks with any results.  - Recommend transfer to the ICU for closer monitoring given high risk of   rebleeding.  - Advance diet as tolerated.   - Continue present medications including IV PPI BID for 72 hours. Will need   oral PPI BID on discharge for an 8-week course.  - Would obtain H. pylori stool antigen. Treat for eradication if positive.   - Avoid all NSAIDs on discharge.    - Call GI if patient has any evidence of recurrent GI blood loss.  For questions, problems or results please call your physician - Scotty Thomas MD.  EMERGENCY PHONE NUMBER: [phone],  LAB RESULTS: [phone]  IF A COMPLICATION OR EMERGENCY SITUATION ARISES AND YOU ARE UNABLE TO REACH   YOUR PHYSICIAN - GO DIRECTLY TO THE EMERGENCY ROOM.  MD Scotty Fernandez MD  12/27/2021 8:27:35 AM  This report has been verified and signed electronically.  Dear patient,  As a result of recent federal legislation (The Federal Cures Act), you may   receive lab or pathology results from your procedure in your [facility]   account before your physician is able to contact you.  Your physician or   their representative will relay the results to you with their   recommendations at their soonest availability.  Thank you,  [employer]ATION

## 2021-12-27 NOTE — PROGRESS NOTES
Pharmacokinetic Assessment Follow Up: IV Vancomycin    Vancomycin serum concentration assessment(s):    The trough level was drawn incorrectly and cannot be used to guide therapy at this time.    Vancomycin Regimen Plan:    Continue regimen to Vancomycin 1750 mg IV every 12 hours with next serum trough concentration measured at 1330  on 12-27-21    Drug levels (last 3 results):  Recent Labs   Lab Result Units 12/26/21  1801   Vancomycin-Trough ug/mL 17.8       Pharmacy will continue to follow and monitor vancomycin.    Please contact pharmacy at extension [phone] for questions regarding this assessment.    Thank you for the consult,   Fernando Jeffrey       Patient brief summary:  Boo Romano is a 56 y.o. male initiated on antimicrobial therapy with IV Vancomycin for treatment of skin & soft tissue infection    Drug Allergies:   Review of patient's allergies indicates:  No Known Allergies    Actual Body Weight:   97 kg    Renal Function:   Estimated Creatinine Clearance: 122.7 mL/min (based on SCr of 0.8 mg/dL).,     Dialysis Method (if applicable):  N/A    CBC (last 72 hours):  Recent Labs   Lab Result Units 12/24/21  2303 12/25/21  0503 12/25/21  0802 12/25/21  1559 12/25/21  1600 12/25/21  2000 12/26/21  0007 12/26/21  0405 12/26/21  1637   WBC K/uL 18.69* 14.39*  --  18.30*  --   --   --  17.60*  --    Hemoglobin g/dL 9.0* 8.4* 7.4* 7.5* 7.4*  7.4* 6.9* 6.6* 6.7*  6.7* 8.3*   Hemoglobin A1C %  --  5.7*  --   --   --   --   --   --   --    Hematocrit % 28.5* 26.1* 22.7* 23.2* 22.6*  22.9* 21.1* 20.4* 20.5*  20.5* 24.9*   Platelets K/uL 309 280  --  289  --   --   --  221  --    Gran % % 76.4* 71.6  --  78.9*  --   --   --  70.6  --    Lymph % % 15.8* 21.8  --  15.7*  --   --   --  20.2  --    Mono % % 5.8 5.3  --  4.2  --   --   --  7.6  --    Eosinophil % % 1.0 0.6  --  0.1  --   --   --  0.6  --    Basophil % % 0.4 0.3  --  0.3  --   --   --  0.3  --    Differential Method  Automated Automated  --  Automated   --   --   --  Automated  --        Metabolic Panel (last 72 hours):  Recent Labs   Lab Result Units 12/24/21  2303 12/25/21  0235 12/25/21  0502 12/25/21  0503 12/25/21  1559 12/26/21  0405   Sodium mmol/L 138  --  137  --  137 137   Potassium mmol/L 4.3  --  4.4  --  4.2 3.5   Chloride mmol/L 107  --  108  --  107 107   CO2 mmol/L 22*  --  21*  --  19* 22*   Glucose mg/dL 154*  --  121*  --  156* 109   BUN mg/dL 40*  --  33*  --  22* 15   Creatinine mg/dL 0.75  --  0.7  --  0.8 0.8   Creatinine, Urine mg/dL  --  81.1  --   --   --   --    Albumin g/dL 3.3*  --  3.1*  --  3.3* 3.0*   Total Bilirubin mg/dL 0.2  --  0.3  --  0.2 0.3   Alkaline Phosphatase U/L 88  --  79  --  74 69   AST U/L 18  --  13  --  14 12   ALT U/L 18  --  14  --  17 14   Magnesium mg/dL  --   --   --  2.0 1.8  --    Phosphorus mg/dL  --   --   --  3.4 3.2  --        Vancomycin Administrations:  vancomycin given in the last 96 hours                     vancomycin (VANCOCIN) 1,750 mg in dextrose 5 % 500 mL IVPB (mg) 1,750 mg New Bag 12/26/21 1232     1,750 mg New Bag 12/25/21 2309    vancomycin (VANCOCIN) 2,250 mg in dextrose 5 % 500 mL IVPB (mg) 2,250 mg New Bag 12/25/21 1011                    Microbiologic Results:  Microbiology Results (last 7 days)       Procedure Component Value Units Date/Time    Blood culture [[id number]] Collected: 12/25/21 0502    Order Status: Completed Specimen: Blood Updated: 12/26/21 1012     Blood Culture, Routine No Growth to date      No Growth to date    Blood culture [[id number]] Collected: 12/25/21 0502    Order Status: Completed Specimen: Blood Updated: 12/26/21 1012     Blood Culture, Routine No Growth to date      No Growth to date

## 2021-12-27 NOTE — PROGRESS NOTES
Pharmacist Renal Dose Adjustment Note    Boo Romano is a 56 y.o. male being treated with the medication zosyn    Patient Data:    Vital Signs (Most Recent):  Temp: (!) 100.7 °F (38.2 °C) (12/27/21 1206)  Pulse: 105 (12/27/21 1141)  Resp: (!) 22 (12/27/21 1141)  BP: (!) 171/90 (12/27/21 1100)  SpO2: 99 % (12/27/21 1141)   Vital Signs (72h Range):  Temp:  [97.6 °F (36.4 °C)-100.7 °F (38.2 °C)]   Pulse:  []   Resp:  [11-69]   BP: ()/()   SpO2:  [40 %-100 %]      Recent Labs   Lab 12/25/21  1559 12/26/21  0405 12/27/21  0303   CREATININE 0.8 0.8 0.8     Serum creatinine: 0.8 mg/dL 12/27/21 0303  Estimated creatinine clearance: 122.7 mL/min    Medication:Zosyn dose: 4.5g frequency q 6 hours will be changed to medication:zosyn dose:4.5g frequency:q 8 hours    Pharmacist's Name: Liyah Abraham  Pharmacist's Extension: [phone]

## 2021-12-27 NOTE — ASSESSMENT & PLAN NOTE
EGD on 12/25 with a large, cratered ulcer with non-bleeding visible vessel in the duodenal bulb (Chase 2A) treated with epinephrine injection and bipolar cautery. No further episodes of melena after the procedure.     Plan:  - Trend CBC, transfuse PRBCs for hemoglobin <7  - IV PPI BID for 72 hours, will need oral PPI BID for 8-week course  - Follow-up H. Pylori stool antigen; treat for eradication if positive  - Avoid all NSAIDs on discharge   - Please call GI if any evidence of recurrent GI blood loss (eg. Melena, hematochezia, hematemesis)

## 2021-12-27 NOTE — PROGRESS NOTES
Pulmonology/Critical Care Progress Note:    Last 24 hours:  Able to have repeat CT head while on sedation, no concerning changes. Leukocytosis improving. Last dose abx this am, now spiking fever.     Objective:  Vitals: reviewed  General: sedated and comfortable on exam  HEENT: nonicteric, MMM, no JVD  CV: tachycardic, no murmurs, no edema  Resp: CTAB, tachypneic, no wheezing or crackles  Abd: soft, NTTP  Extr: no cyanosis or edema, no clubbing  Neuro: as above- sedated  24hr Intake/Output: 2.3/3.4  Lines and tubes: PIV     Labs: Impression: leukocytosis improving, anemia stabilized, mild hyponatremia today     Imaging: CT head 12/26:  FINDINGS:  The ventricles and the basilar cisterns are patent.  Normal gray-white matter differentiation is preserved.  No acute bleed or extra-axial collection.  There is no mass effect or shift of the midline structures.  No Chiari malformation.  Orbits and orbital contents are within normal limits.  Mucoperiosteal thickening of the ethmoid sinuses with moderate left greater than right maxillary sinusitis and mild left sphenoid sinusitis changes noted.  Mastoid air cells are clear.  Skull is intact.  Mild motion artifact is present.  No acute abnormality.  Further evaluation or follow-up could be performed if symptoms persist as clinically warranted.     Echo: 12/25:  · Normal systolic function.  · The estimated ejection fraction is 65%.  · Normal left ventricular diastolic function.  · Normal right ventricular size with normal right ventricular systolic function.  · Normal central venous pressure (3 mmHg).     Assessment:  1. Agitated delirium requiring intubation and sedation  2. GI bleed s/p intervention  3. Polysubstance use/withdrawal  4. New fever, unknown source     Plan/Recommendations:  Neuro:  #agitated delirium: now on ketamine, fentanyl and versed drips, will continue weaning slowly. Suspect will need 48-72 hours for full withdrawal. SAT in AM.  #polysubstance use:  unclear toxidrome, appears sympathomimetic from meth vs PCP. Tox + for opiates and amphetamines. Would avoid suboxone admin for now until completed withdrawal and outpatient.      Cards:  #HTN, tachycardia: related to toxidrome above, improved once well-sedated  #Trop elevation to 0.05, likely from stress response, continue to trend     Resp:  #Intubation for airway protection: continue LPMV with 6cc/kg IBW, PPlat <30  Vent day and settings: day 2, AC/VC 22/480/5/40%  Blood gases: 7.43/35/138     FEN/GI:  NPO for now, has received sufficient fluids  K>4, Mg>2, Phos>3  Chronic HCV, HBV, no hx cirrhosis  #Acute GI bleed s/p intervention: continue PPI IV BID, continue CBC q8, transfuse for <7 or in shock, awaiting repeat GI recs     Renal: no acute issues. Significant UOP, continue to monitor labs, net even throughout hospitalization.   Cr: 0.7-> 0.8     Endo:  Low- normal TSH, no hx hyperthyroidism  Glucose checks q6, Hb 5.7     Heme/ID:  New fever today- would continue abx; repeated Bcx and resp cx  Abx and day: vanc/zosyn day 3  Cultures: Bcx NGTD     Gi PPX: PPI IV BID  DVT ppx: SCDs, acute GI bleed  Code status: FULL     Thank you for the consult. We will continue to follow along with you. Discussed with attending Dr. Jamarcus Edmond MD  PCCM fellow    Pulmonary/Critical Care Attending with Dr. Edmond    Patient seen and examined on rounds with team after review of ON hospital course, labs and xrays.  I have reviewed, we discussed, and I have verified her findings, assessment and recommendations. The patient remains sedated on the vent and is calm this am.  His chest has coarse breath sounds and heart is RRR.  No pedal edema.  He was likely withdrawing from illicit drugs as he has been known to use them.  He is stable on the ventilator but had fever (100.7 or 38.2 degrees) earlier today.  Continue current care and may try stopping sedation and doing an SBT in the am if he is calm and if his fever abates, to  assess if he awakens.  Critical care time 32 min for review of chart, lab data and images, examination of the patient, monitoring, and adjusting as necessary the management plan for support of vital organ system (respiratory), and also for discussion with other caregivers and the family members.    Ellen Ricketts MD  12/27/2021  1:46 PM

## 2021-12-27 NOTE — PROGRESS NOTES
Therapy with vancomycin complete and/or consult discontinued by provider.  Pharmacy will sign off, please re-consult as needed.    Liyah Abraham, PharmD, BCPS  Clinical Pharmacist  [phone]

## 2021-12-27 NOTE — PROGRESS NOTES
Pharmacokinetic Initial Assessment: IV Vancomycin    Assessment/Plan:    Reconsulted for vancomycin. Will continue vancomycin 1750 mg IV and recheck vancomycin trough on 12/28 at 1230.   Desired empiric serum trough concentration is 15 to 20 mcg/mL  Pharmacy will continue to follow and monitor vancomycin.      Please contact pharmacy at extension [phone] with any questions regarding this assessment.     Thank you for the consult,   Liyah Abraham       Patient brief summary:  Boo Romano is a 56 y.o. male initiated on antimicrobial therapy with IV Vancomycin for treatment of suspected sepsis    Drug Allergies:   Review of patient's allergies indicates:  No Known Allergies    Actual Body Weight:   97 kg    Renal Function:   Estimated Creatinine Clearance: 122.7 mL/min (based on SCr of 0.8 mg/dL).,     Dialysis Method (if applicable):  N/A    CBC (last 72 hours):  Recent Labs   Lab Result Units 12/24/21  2303 12/25/21  0503 12/25/21  0802 12/25/21  1559 12/25/21  1600 12/25/21  2000 12/26/21  0007 12/26/21  0405 12/26/21  1637 12/26/21  2343 12/27/21  0303 12/27/21  0822   WBC K/uL 18.69* 14.39*  --  18.30*  --   --   --  17.60*  --   --  15.80*  --    Hemoglobin g/dL 9.0* 8.4* 7.4* 7.5* 7.4*  7.4* 6.9* 6.6* 6.7*  6.7* 8.3* 7.6* 7.9* 8.0*   Hemoglobin A1C %  --  5.7*  --   --   --   --   --   --   --   --   --   --    Hematocrit % 28.5* 26.1* 22.7* 23.2* 22.6*  22.9* 21.1* 20.4* 20.5*  20.5* 24.9* 23.1* 24.3* 24.7*   Platelets K/uL 309 280  --  289  --   --   --  221  --   --  258  --    Gran % % 76.4* 71.6  --  78.9*  --   --   --  70.6  --   --  71.6  --    Lymph % % 15.8* 21.8  --  15.7*  --   --   --  20.2  --   --  18.6  --    Mono % % 5.8 5.3  --  4.2  --   --   --  7.6  --   --  8.3  --    Eosinophil % % 1.0 0.6  --  0.1  --   --   --  0.6  --   --  0.8  --    Basophil % % 0.4 0.3  --  0.3  --   --   --  0.3  --   --  0.3  --    Differential Method  Automated Automated  --  Automated  --   --   --   Automated  --   --  Automated  --        Metabolic Panel (last 72 hours):  Recent Labs   Lab Result Units 12/24/21  2303 12/25/21  0235 12/25/21  0502 12/25/21  0503 12/25/21  1559 12/26/21  0405 12/27/21  0303   Sodium mmol/L 138  --  137  --  137 137 132*   Potassium mmol/L 4.3  --  4.4  --  4.2 3.5 3.8   Chloride mmol/L 107  --  108  --  107 107 102   CO2 mmol/L 22*  --  21*  --  19* 22* 21*   Glucose mg/dL 154*  --  121*  --  156* 109 100   BUN mg/dL 40*  --  33*  --  22* 15 7   Creatinine mg/dL 0.75  --  0.7  --  0.8 0.8 0.8   Creatinine, Urine mg/dL  --  81.1  --   --   --   --   --    Albumin g/dL 3.3*  --  3.1*  --  3.3* 3.0* 3.1*   Total Bilirubin mg/dL 0.2  --  0.3  --  0.2 0.3 0.3   Alkaline Phosphatase U/L 88  --  79  --  74 69 76   AST U/L 18  --  13  --  14 12 11   ALT U/L 18  --  14  --  17 14 13   Magnesium mg/dL  --   --   --  2.0 1.8  --   --    Phosphorus mg/dL  --   --   --  3.4 3.2  --   --        Drug levels (last 3 results):  Recent Labs   Lab Result Units 12/26/21  1801   Vancomycin-Trough ug/mL 17.8       Microbiologic Results:  Microbiology Results (last 7 days)       Procedure Component Value Units Date/Time    Culture, Respiratory with Gram Stain [[id number]] Collected: 12/27/21 1142    Order Status: Sent Specimen: Respiratory from Tracheal Aspirate Updated: 12/27/21 1202    Blood culture [[id number]] Collected: 12/25/21 0502    Order Status: Completed Specimen: Blood Updated: 12/27/21 1012     Blood Culture, Routine No Growth to date      No Growth to date      No Growth to date    Blood culture [[id number]] Collected: 12/25/21 0502    Order Status: Completed Specimen: Blood Updated: 12/27/21 1012     Blood Culture, Routine No Growth to date      No Growth to date      No Growth to date    Blood culture [[id number]] Collected: 12/27/21 1011    Order Status: Sent Specimen: Blood Updated: 12/27/21 1011

## 2021-12-27 NOTE — PROGRESS NOTES
[facility] - Intensive Care  Gastroenterology  Progress Note    Patient Name: Boo Romano  MRN: [id number]  Admission Date: 12/24/2021  Hospital Length of Stay: 2 days  Code Status: Full Code   Attending Provider: Luigi Bell III, MD  Consulting Provider: Garrison Casillas MD, Victor Hugo Jones MD  Primary Care Physician: Ervin Childress Iii, MD  Principal Problem: GI bleed      Subjective:     Interval History:   No acute events overnight. Patient remains intubated and sedated. He has not had any further episodes of melena.     Review of Systems   Unable to perform ROS: Intubated     Objective:     Vital Signs (Most Recent):  Temp: 100 °F (37.8 °C) (12/27/21 0700)  Pulse: 88 (12/27/21 0740)  Resp: (!) 22 (12/27/21 0740)  BP: (!) 170/80 (12/27/21 0700)  SpO2: 97 % (12/27/21 0740) Vital Signs (24h Range):  Temp:  [98.4 °F (36.9 °C)-100 °F (37.8 °C)] 100 °F (37.8 °C)  Pulse:  [82-98] 88  Resp:  [22-31] 22  SpO2:  [95 %-100 %] 97 %  BP: (153-176)/(64-87) 170/80     Weight: 97 kg (213 lb 13.5 oz) (12/25/21 1500)  Body mass index is 27.46 kg/m².      Intake/Output Summary (Last 24 hours) at 12/27/2021 0853  Last data filed at 12/27/2021 0656  Gross per 24 hour   Intake 1959.94 ml   Output 3265 ml   Net -1305.06 ml       Lines/Drains/Airways     Drain                 Urethral Catheter 12/25/21 1532 Latex 14 Fr. 1 day          Airway                 Airway - Non-Surgical 12/25/21 1455 Endotracheal Tube 1 day          Peripheral Intravenous Line                 Peripheral IV - Single Lumen 12/24/21 2300 20 G;18 G Right Antecubital 2 days         Peripheral IV - Single Lumen 12/25/21 1535 20 G Left Antecubital 1 day         Peripheral IV - Single Lumen 12/25/21 1603 20 G Anterior;Right Forearm 1 day                Physical Exam  Constitutional:       General: He is not in acute distress.     Comments: Intubated and sedated.    HENT:      Head: Normocephalic and atraumatic.   Eyes:      General: No scleral icterus.     Comments: Pupils  constricted.    Cardiovascular:      Rate and Rhythm: Normal rate and regular rhythm.      Heart sounds: No murmur heard.  No gallop.    Pulmonary:      Breath sounds: No wheezing or rales.      Comments: Intubated.   Abdominal:      General: Bowel sounds are normal. There is no distension.      Tenderness: There is no abdominal tenderness. There is no guarding.         Significant Labs:  CBC:   Recent Labs   Lab 12/25/21  1559 12/25/21  1600 12/26/21  0405 12/26/21  1637 12/26/21  2343 12/27/21  0303 12/27/21  0822   WBC 18.30*  --  17.60*  --   --  15.80*  --    HGB 7.5*   < > 6.7*  6.7*   < > 7.6* 7.9* 8.0*   HCT 23.2*   < > 20.5*  20.5*   < > 23.1* 24.3* 24.7*     --  221  --   --  258  --     < > = values in this interval not displayed.     CMP:   Recent Labs   Lab 12/27/21  0303      CALCIUM 8.5*   ALBUMIN 3.1*   PROT 5.9*   *   K 3.8   CO2 21*      BUN 7   CREATININE 0.8   ALKPHOS 76   ALT 13   AST 11   BILITOT 0.3     Coagulation: No results for input(s): PT, INR, APTT in the last 48 hours.      Significant Imaging:  CT: I have reviewed all results within the past 24 hours and my personal findings are:  No acute intracranial process.     Assessment/Plan:     * GI bleed  EGD on 12/25 with a large, cratered ulcer with non-bleeding visible vessel in the duodenal bulb (Chase 2A) treated with epinephrine injection and bipolar cautery. No further episodes of melena after the procedure.     Plan:  - Trend CBC, transfuse PRBCs for hemoglobin <7  - IV PPI BID for 72 hours, will need oral PPI BID for 8-week course  - Follow-up H. Pylori stool antigen; treat for eradication if positive  - Avoid all NSAIDs on discharge   - Please call GI if any evidence of recurrent GI blood loss (eg. Melena, hematochezia, hematemesis)        Thank you for your consult. I will follow-up with patient. Please contact us if you have any additional questions.    Garrison Casillas MD  Gastroenterology  [facility]ensive [facility]

## 2021-12-27 NOTE — PROGRESS NOTES
"Progress Note  [facility]    Attending: Ray  Admit Date: 12/24/2021  Today's Date: 12/27/2021    HPI:  Boo Romano is a 56-year-old PMHx COPD, bipolar 1 disorder, chronic hepatitis C, IVDU (heroin) who presented to [facility] ED with complaint of bloody bowel movements and witnessed syncopal episode. Endorses bowel movements are black with mixed bright red blood. Endorses a 1 month history of bloody bowel movements but acute worsening yesterday. Patient was found by significant other with a witnessed syncopal episode while laying on sofa. Patient endorses associated mild epigastric pain, diffuse body weakness, skin pallor, and dizziness. Tried taking Pepto-Bismol at home for the diarrhea and abdominal pain but was provided little relief. Endorses pulling "large worms" out of his stools around 2 feet in length. Admits to daily IV heroin use due to chronic low back pain. Smokes 1/2 pack of cigarettes per day. Denies any alcohol use, chronic NSAID use, nausea/vomiting, abdominal distension, fever/chills.      In [facility] ED, patient hypotensive BP 97/62. CBC with anemia (hemoglobin 9.0) and leukocytosis (WBC count 18.69). Was provided 1 L bolus NS and IV Protonix 40 mg in ED. GI was consulted and patient was admitted to [facility] for GI bleed.      SUBJECTIVE:     Yesterday remained on sedation without any issues. He was able to go downstairs for CT head which showed no acute findings, no acute bleed or abnormalities. He received 1u pRBC yesterday and had appropriate response in H/H with no complications. He was able to be weaned down a little from ketamine and versed and reports of more movement and responsiveness w/ eye opening.    Patient seen and examined this morning resting comfortably. He did have some movement of LE's but was not responsive. Still sedated with Ketamine, Versed, and Fentanyl.    Review of Systems   Unable to perform ROS: Intubated "       OBJECTIVE:     Vital Signs Trends/Hx Reviewed  Vitals:    12/27/21 0330 12/27/21 0408 12/27/21 0550 12/27/21 0740   BP:   (!) 170/86    BP Location:       Patient Position:       Pulse:  95 97 88   Resp:  (!) 22 (!) 23 (!) 22   Temp: 98.5 °F (36.9 °C)      TempSrc: Oral      SpO2:  98% 97% 97%   Weight:       Height:           Vent Mode: A/C  Oxygen Concentration (%):  [25-30] 25  Resp Rate Total:  [22 br/min-23 br/min] 22 br/min  Vt Set:  [480 mL] 480 mL  PEEP/CPAP:  [5 cmH20] 5 cmH20  Mean Airway Pressure:  [9 wjM10-31.8 cmH20] 10.2 cmH20    Physical Exam  Vitals and nursing note reviewed.   Constitutional:       General: He is not in acute distress.     Appearance: He is not ill-appearing or toxic-appearing.   HENT:      Head: Normocephalic and atraumatic.      Right Ear: External ear normal.      Left Ear: External ear normal.      Nose: Nose normal.   Eyes:      General: No scleral icterus.     Conjunctiva/sclera: Conjunctivae normal.   Cardiovascular:      Rate and Rhythm: Normal rate and regular rhythm.      Pulses: Normal pulses.      Heart sounds: Normal heart sounds.   Pulmonary:      Comments: Intubated breathing with vent  Abdominal:      General: Bowel sounds are normal.      Palpations: Abdomen is soft. There is no mass.      Tenderness: There is no abdominal tenderness.   Musculoskeletal:         General: No swelling.      Right lower leg: No edema.      Left lower leg: No edema.   Lymphadenopathy:      Cervical: No cervical adenopathy.   Skin:     General: Skin is warm and dry.      Findings: No rash.         Laboratory:  Recent Labs   Lab 12/27/21  0303   WBC 15.80*   RBC 2.92*   HGB 7.9*   HCT 24.3*      MCV 83   MCH 27.1   MCHC 32.5     Recent Labs   Lab 12/27/21  0303   *   K 3.8      CO2 21*   BUN 7   CREATININE 0.8     No results for input(s): PH, PCO2, PO2, HCO3, POCSATURATED, BE in the last 24 hours.      Chest Imaging:   No new chest imaging.     ASSESSMENT &  RECOMMENDATIONS     Neuro/Psych  -Intubated and sedated on ketamine, fentanyl, and versed  -Able to wean some from ketamine and versed w/ pt slightly more responsive   HX of polysubstance abuse  -Utox was pos for opiates and amphetamines  -Received 2 dose of 8mg suboxone 12/25 prior to anesthesia for EGD 2/2 complaints of withdrawals  -After waking from anesthesia had agitated delirium requiring sedation and intubation  -Will avoid suboxone at this time.   -CT head w/ no acute findings      CV  HTN which was likely from drug use   -BP this AM while in room was 170/86  -Was going to start oral but still NPO so on hydralazine 10mg prn  -Not on any pressers  -Elevated troponin 0.041 on admit with peak 0.054 and trended down on repeat to 0.02  -EKG not uploaded  -ECHO yesterday w/ normal LVSF and EF 65%     Pulm  Intubated for airway protection  Vent settings this AM as above   Most recent ABG from yesterday pH 7.4 PCO2 35.7 Bicarb 23.7 PO2 138  No new CXR but previous w/ no acute cardiopulm issues seen  Wean vent settings as tolerated and SBT once appropriate     FEN/GI  NPO  Electrolytes: Hyponatremic 132 and K+ 3.8 and repleted   GI did EGD 12/25 rec cnt IV PPI BID for 72hr (end date 12/27) then oral for 8 wks     RENAL  UOP: 3465cc , In: 2306cc, net -1158cc in last 24 hrs     BUN/Cr: 7/0.8  Continue to monitor renal status and urine output     Heme  H/H 7.5/26 on admit but trended down to 6.7/20 so had 1u pRBC 12/26 w/ repeat H/H 8.3/24.9 this AM 7.9/24.3  WBC 15 from 17 yest  Folate and B12 wnl  GI rec Fe studies   -Fe and Fe sat low 39 and 12 Transferrin and TIBC wnl 218 and 323  -Ferritin wnl 32  DVT prophylaxis: SCDs     Endo  TSH on admit wnl but low 0.427   A1C 5.7   Glucose checks q6hr     ID  WBC 18 on admit and 17.6 AM  On Vanc Zosyn for 2 days but stopped this AM  Bld cx's from admit w/ NGTD  Hep panel, Hep C RNA, Stool ova parasite, and H pylori antigen pending  -HIV neg      Stephen Turner M.D.  [facility]  Pulmonary/Critical Care   12/27/2021 10:18 AM

## 2021-12-27 NOTE — SUBJECTIVE & OBJECTIVE
Subjective:     Interval History:   No acute events overnight. Patient remains intubated and sedated. He has not had any further episodes of melena.     Review of Systems   Unable to perform ROS: Intubated     Objective:     Vital Signs (Most Recent):  Temp: 100 °F (37.8 °C) (12/27/21 0700)  Pulse: 88 (12/27/21 0740)  Resp: (!) 22 (12/27/21 0740)  BP: (!) 170/80 (12/27/21 0700)  SpO2: 97 % (12/27/21 0740) Vital Signs (24h Range):  Temp:  [98.4 °F (36.9 °C)-100 °F (37.8 °C)] 100 °F (37.8 °C)  Pulse:  [82-98] 88  Resp:  [22-31] 22  SpO2:  [95 %-100 %] 97 %  BP: (153-176)/(64-87) 170/80     Weight: 97 kg (213 lb 13.5 oz) (12/25/21 1500)  Body mass index is 27.46 kg/m².      Intake/Output Summary (Last 24 hours) at 12/27/2021 0853  Last data filed at 12/27/2021 0656  Gross per 24 hour   Intake 1959.94 ml   Output 3265 ml   Net -1305.06 ml       Lines/Drains/Airways     Drain                 Urethral Catheter 12/25/21 1532 Latex 14 Fr. 1 day          Airway                 Airway - Non-Surgical 12/25/21 1455 Endotracheal Tube 1 day          Peripheral Intravenous Line                 Peripheral IV - Single Lumen 12/24/21 2300 20 G;18 G Right Antecubital 2 days         Peripheral IV - Single Lumen 12/25/21 1535 20 G Left Antecubital 1 day         Peripheral IV - Single Lumen 12/25/21 1603 20 G Anterior;Right Forearm 1 day                Physical Exam  Constitutional:       General: He is not in acute distress.     Comments: Intubated and sedated.    HENT:      Head: Normocephalic and atraumatic.   Eyes:      General: No scleral icterus.     Comments: Pupils constricted.    Cardiovascular:      Rate and Rhythm: Normal rate and regular rhythm.      Heart sounds: No murmur heard.  No gallop.    Pulmonary:      Breath sounds: No wheezing or rales.      Comments: Intubated.   Abdominal:      General: Bowel sounds are normal. There is no distension.      Tenderness: There is no abdominal tenderness. There is no guarding.          Significant Labs:  CBC:   Recent Labs   Lab 12/25/21  1559 12/25/21  1600 12/26/21  0405 12/26/21  1637 12/26/21  2343 12/27/21  0303 12/27/21  0822   WBC 18.30*  --  17.60*  --   --  15.80*  --    HGB 7.5*   < > 6.7*  6.7*   < > 7.6* 7.9* 8.0*   HCT 23.2*   < > 20.5*  20.5*   < > 23.1* 24.3* 24.7*     --  221  --   --  258  --     < > = values in this interval not displayed.     CMP:   Recent Labs   Lab 12/27/21  0303      CALCIUM 8.5*   ALBUMIN 3.1*   PROT 5.9*   *   K 3.8   CO2 21*      BUN 7   CREATININE 0.8   ALKPHOS 76   ALT 13   AST 11   BILITOT 0.3     Coagulation: No results for input(s): PT, INR, APTT in the last 48 hours.      Significant Imaging:  CT: I have reviewed all results within the past 24 hours and my personal findings are:  No acute intracranial process.

## 2021-12-28 VITALS
OXYGEN SATURATION: 93 % | TEMPERATURE: 99 F | SYSTOLIC BLOOD PRESSURE: 146 MMHG | DIASTOLIC BLOOD PRESSURE: 85 MMHG | RESPIRATION RATE: 35 BRPM | BODY MASS INDEX: 27.45 KG/M2 | HEART RATE: 122 BPM | WEIGHT: 213.88 LBS | HEIGHT: 74 IN

## 2021-12-28 LAB
ALBUMIN SERPL BCP-MCNC: 3.5 G/DL (ref 3.5–5.2)
ALP SERPL-CCNC: 93 U/L (ref 55–135)
ALT SERPL W/O P-5'-P-CCNC: 11 U/L (ref 10–44)
ANION GAP SERPL CALC-SCNC: 14 MMOL/L (ref 8–16)
AST SERPL-CCNC: 12 U/L (ref 10–40)
BILIRUB SERPL-MCNC: 0.3 MG/DL (ref 0.1–1)
BLD PROD TYP BPU: NORMAL
BLD PROD TYP BPU: NORMAL
BLOOD UNIT EXPIRATION DATE: NORMAL
BLOOD UNIT EXPIRATION DATE: NORMAL
BLOOD UNIT TYPE CODE: 9500
BLOOD UNIT TYPE CODE: 9500
BLOOD UNIT TYPE: NORMAL
BLOOD UNIT TYPE: NORMAL
BUN SERPL-MCNC: 7 MG/DL (ref 6–20)
CALCIUM SERPL-MCNC: 9.3 MG/DL (ref 8.7–10.5)
CHLORIDE SERPL-SCNC: 107 MMOL/L (ref 95–110)
CO2 SERPL-SCNC: 19 MMOL/L (ref 23–29)
CODING SYSTEM: NORMAL
CODING SYSTEM: NORMAL
CREAT SERPL-MCNC: 0.9 MG/DL (ref 0.5–1.4)
DISPENSE STATUS: NORMAL
DISPENSE STATUS: NORMAL
EST. GFR  (AFRICAN AMERICAN): >60 ML/MIN/1.73 M^2
EST. GFR  (NON AFRICAN AMERICAN): >60 ML/MIN/1.73 M^2
GLUCOSE SERPL-MCNC: 109 MG/DL (ref 70–110)
HCT VFR BLD AUTO: 26.4 % (ref 40–54)
HCT VFR BLD AUTO: 29.6 % (ref 40–54)
HGB BLD-MCNC: 8.7 G/DL (ref 14–18)
HGB BLD-MCNC: 9.5 G/DL (ref 14–18)
O+P STL MICRO: NORMAL
POTASSIUM SERPL-SCNC: 3.9 MMOL/L (ref 3.5–5.1)
PROT SERPL-MCNC: 7 G/DL (ref 6–8.4)
SODIUM SERPL-SCNC: 140 MMOL/L (ref 136–145)
TRANS ERYTHROCYTES VOL PATIENT: NORMAL ML
TRANS ERYTHROCYTES VOL PATIENT: NORMAL ML

## 2021-12-28 PROCEDURE — 25000242 PHARM REV CODE 250 ALT 637 W/ HCPCS: Performed by: INTERNAL MEDICINE

## 2021-12-28 PROCEDURE — 25000003 PHARM REV CODE 250: Performed by: STUDENT IN AN ORGANIZED HEALTH CARE EDUCATION/TRAINING PROGRAM

## 2021-12-28 PROCEDURE — 63600175 PHARM REV CODE 636 W HCPCS: Performed by: STUDENT IN AN ORGANIZED HEALTH CARE EDUCATION/TRAINING PROGRAM

## 2021-12-28 PROCEDURE — 27000221 HC OXYGEN, UP TO 24 HOURS

## 2021-12-28 PROCEDURE — 36415 COLL VENOUS BLD VENIPUNCTURE: CPT | Performed by: STUDENT IN AN ORGANIZED HEALTH CARE EDUCATION/TRAINING PROGRAM

## 2021-12-28 PROCEDURE — 94761 N-INVAS EAR/PLS OXIMETRY MLT: CPT

## 2021-12-28 PROCEDURE — 85018 HEMOGLOBIN: CPT | Mod: 91 | Performed by: STUDENT IN AN ORGANIZED HEALTH CARE EDUCATION/TRAINING PROGRAM

## 2021-12-28 PROCEDURE — 63600175 PHARM REV CODE 636 W HCPCS: Performed by: INTERNAL MEDICINE

## 2021-12-28 PROCEDURE — 63600175 PHARM REV CODE 636 W HCPCS

## 2021-12-28 PROCEDURE — 94640 AIRWAY INHALATION TREATMENT: CPT

## 2021-12-28 PROCEDURE — 27200966 HC CLOSED SUCTION SYSTEM

## 2021-12-28 PROCEDURE — C9113 INJ PANTOPRAZOLE SODIUM, VIA: HCPCS | Performed by: STUDENT IN AN ORGANIZED HEALTH CARE EDUCATION/TRAINING PROGRAM

## 2021-12-28 PROCEDURE — 94003 VENT MGMT INPAT SUBQ DAY: CPT

## 2021-12-28 PROCEDURE — 25000003 PHARM REV CODE 250: Performed by: FAMILY MEDICINE

## 2021-12-28 PROCEDURE — 85014 HEMATOCRIT: CPT | Mod: 91 | Performed by: STUDENT IN AN ORGANIZED HEALTH CARE EDUCATION/TRAINING PROGRAM

## 2021-12-28 PROCEDURE — 94660 CPAP INITIATION&MGMT: CPT

## 2021-12-28 PROCEDURE — 85018 HEMOGLOBIN: CPT | Performed by: STUDENT IN AN ORGANIZED HEALTH CARE EDUCATION/TRAINING PROGRAM

## 2021-12-28 PROCEDURE — 80053 COMPREHEN METABOLIC PANEL: CPT | Performed by: STUDENT IN AN ORGANIZED HEALTH CARE EDUCATION/TRAINING PROGRAM

## 2021-12-28 PROCEDURE — 85014 HEMATOCRIT: CPT | Performed by: STUDENT IN AN ORGANIZED HEALTH CARE EDUCATION/TRAINING PROGRAM

## 2021-12-28 PROCEDURE — 63600175 PHARM REV CODE 636 W HCPCS: Performed by: FAMILY MEDICINE

## 2021-12-28 RX ORDER — HALOPERIDOL 5 MG/ML
5 INJECTION INTRAMUSCULAR ONCE
Status: COMPLETED | OUTPATIENT
Start: 2021-12-28 | End: 2021-12-28

## 2021-12-28 RX ORDER — LORAZEPAM 2 MG/ML
2 INJECTION INTRAMUSCULAR ONCE
Status: COMPLETED | OUTPATIENT
Start: 2021-12-28 | End: 2021-12-28

## 2021-12-28 RX ORDER — BUPRENORPHINE AND NALOXONE 2; .5 MG/1; MG/1
2 FILM, SOLUBLE BUCCAL; SUBLINGUAL ONCE
Status: COMPLETED | OUTPATIENT
Start: 2021-12-28 | End: 2021-12-28

## 2021-12-28 RX ORDER — IPRATROPIUM BROMIDE AND ALBUTEROL SULFATE 2.5; .5 MG/3ML; MG/3ML
3 SOLUTION RESPIRATORY (INHALATION) ONCE
Status: DISCONTINUED | OUTPATIENT
Start: 2021-12-28 | End: 2021-12-28

## 2021-12-28 RX ORDER — SCOLOPAMINE TRANSDERMAL SYSTEM 1 MG/1
1 PATCH, EXTENDED RELEASE TRANSDERMAL
Status: DISCONTINUED | OUTPATIENT
Start: 2021-12-28 | End: 2021-12-28 | Stop reason: HOSPADM

## 2021-12-28 RX ORDER — DIPHENHYDRAMINE HYDROCHLORIDE 50 MG/ML
INJECTION INTRAMUSCULAR; INTRAVENOUS
Status: COMPLETED
Start: 2021-12-28 | End: 2021-12-28

## 2021-12-28 RX ORDER — DIPHENHYDRAMINE HYDROCHLORIDE 50 MG/ML
25 INJECTION INTRAMUSCULAR; INTRAVENOUS ONCE
Status: DISCONTINUED | OUTPATIENT
Start: 2021-12-28 | End: 2021-12-28

## 2021-12-28 RX ORDER — HALOPERIDOL 5 MG/ML
INJECTION INTRAMUSCULAR
Status: COMPLETED
Start: 2021-12-28 | End: 2021-12-28

## 2021-12-28 RX ORDER — LORAZEPAM 2 MG/ML
INJECTION INTRAMUSCULAR
Status: COMPLETED
Start: 2021-12-28 | End: 2021-12-28

## 2021-12-28 RX ORDER — IPRATROPIUM BROMIDE AND ALBUTEROL SULFATE 2.5; .5 MG/3ML; MG/3ML
3 SOLUTION RESPIRATORY (INHALATION) ONCE
Status: COMPLETED | OUTPATIENT
Start: 2021-12-28 | End: 2021-12-28

## 2021-12-28 RX ORDER — DIPHENHYDRAMINE HYDROCHLORIDE 50 MG/ML
50 INJECTION INTRAMUSCULAR; INTRAVENOUS ONCE
Status: COMPLETED | OUTPATIENT
Start: 2021-12-28 | End: 2021-12-28

## 2021-12-28 RX ORDER — ALBENDAZOLE 200 MG/1
400 TABLET, FILM COATED ORAL 2 TIMES DAILY
Qty: 16 TABLET | Refills: 0 | Status: SHIPPED | OUTPATIENT
Start: 2021-12-28 | End: 2022-01-01

## 2021-12-28 RX ADMIN — DIPHENHYDRAMINE HYDROCHLORIDE 50 MG: 50 INJECTION INTRAMUSCULAR; INTRAVENOUS at 11:12

## 2021-12-28 RX ADMIN — DIPHENHYDRAMINE HYDROCHLORIDE: 50 INJECTION INTRAMUSCULAR; INTRAVENOUS at 11:12

## 2021-12-28 RX ADMIN — HALOPERIDOL LACTATE: 5 INJECTION, SOLUTION INTRAMUSCULAR at 11:12

## 2021-12-28 RX ADMIN — PANTOPRAZOLE SODIUM 40 MG: 40 INJECTION, POWDER, LYOPHILIZED, FOR SOLUTION INTRAVENOUS at 10:12

## 2021-12-28 RX ADMIN — MUPIROCIN: 20 OINTMENT TOPICAL at 10:12

## 2021-12-28 RX ADMIN — VANCOMYCIN HYDROCHLORIDE 1750 MG: 10 INJECTION, POWDER, LYOPHILIZED, FOR SOLUTION INTRAVENOUS at 02:12

## 2021-12-28 RX ADMIN — SCOPALAMINE 1 PATCH: 1 PATCH, EXTENDED RELEASE TRANSDERMAL at 10:12

## 2021-12-28 RX ADMIN — IPRATROPIUM BROMIDE AND ALBUTEROL SULFATE 3 ML: 2.5; .5 SOLUTION RESPIRATORY (INHALATION) at 08:12

## 2021-12-28 RX ADMIN — KETAMINE HYDROCHLORIDE 19.5 MCG/KG/MIN: 50 INJECTION INTRAMUSCULAR; INTRAVENOUS at 05:12

## 2021-12-28 RX ADMIN — BUPRENORPHINE AND NALOXONE 2 FILM: 2; .5 FILM BUCCAL; SUBLINGUAL at 11:12

## 2021-12-28 RX ADMIN — MIDAZOLAM HYDROCHLORIDE 20 MG/HR: 5 INJECTION, SOLUTION INTRAMUSCULAR; INTRAVENOUS at 06:12

## 2021-12-28 RX ADMIN — MIDAZOLAM HYDROCHLORIDE 20 MG/HR: 5 INJECTION, SOLUTION INTRAMUSCULAR; INTRAVENOUS at 01:12

## 2021-12-28 RX ADMIN — LORAZEPAM 2 MG: 2 INJECTION INTRAMUSCULAR; INTRAVENOUS at 11:12

## 2021-12-28 RX ADMIN — LORAZEPAM: 2 INJECTION INTRAMUSCULAR; INTRAVENOUS at 11:12

## 2021-12-28 RX ADMIN — BUPRENORPHINE HYDROCHLORIDE, NALOXONE HYDROCHLORIDE 2 FILM: 2; .5 FILM, SOLUBLE BUCCAL; SUBLINGUAL at 10:12

## 2021-12-28 RX ADMIN — HALOPERIDOL LACTATE 5 MG: 5 INJECTION, SOLUTION INTRAMUSCULAR at 11:12

## 2021-12-28 RX ADMIN — PIPERACILLIN AND TAZOBACTAM 4.5 G: 4; .5 INJECTION, POWDER, LYOPHILIZED, FOR SOLUTION INTRAVENOUS; PARENTERAL at 10:12

## 2021-12-28 RX ADMIN — PIPERACILLIN AND TAZOBACTAM 4.5 G: 4; .5 INJECTION, POWDER, LYOPHILIZED, FOR SOLUTION INTRAVENOUS; PARENTERAL at 03:12

## 2021-12-28 RX ADMIN — Medication 300 MCG/HR: at 06:12

## 2021-12-28 NOTE — PHYSICIAN QUERY
PT Name: Boo Romano  MR #: [id number]     DOCUMENTATION CLARIFICATION      CDS: Brandy Capley, RN  Email: [email or web address]    This form is a permanent document in the medical record.    Query Date: December 28, 2021    By submitting this query, we are merely seeking further clarification of documentation to reflect the severity of illness of your patient. Please utilize your independent clinical judgment when addressing the question(s) below.     The Medical Record contains the following:   Indicators   Supporting Clinical Findings Location in Medical Record   X Gastrointestinal Ulcer Documented            One non-bleeding cratered duodenal ulcer with a nonbleeding visible        vessel (Chase Class IIa) was found in the duodenal bulb. The        lesion was 15 mm in largest dimension. Area was successfully        injected with 8 mL of a 1:10,000 solution of epinephrine for        hemostasis.      Impression:                           - Single, cratered ulcer in the duodenal bulb with                          a non-bleeding vessel and pigmented spot (Chase                          Class IIa). Injected with epinephrine and treated                          with bipolar cautery with no bleeding on                          completion of the procedure.                              Upper GI endoscopy 12/25    EGD/Colonscopy Findings      Pathology Findings      Radiology Findings     X Treatment/Medication Keep NPO  IV Protonix 40mg Q12H  Will start broad spectrum Vanc and Zosyn given concern for sepsis  BP currently stable will consider IVF if needed  Will trend H&H Q4H  Transfuse if hemoglobin < 7  Follow-up BloodCx, Iron + TIBC, ferritin, folate, Vit B12 levels   H&P 12/25   X Other Patient presents with   GI Bleeding      Pt to the ER with c/o blood in stool. PT reports 6 times of bloody stools today. Pt reports problems 2 weeks ago but reports stopped. Pt reports this time blood for 2 days. Pt c/o weakness. Pt  "reports syncopal episode at home. Pt reports he has been taking Pepto Bismol    Boo Romano is a 56-year-old PMHx COPD, bipolar 1 disorder, chronic hepatitis C, IVDU (heroin) who presented to [facility] ED with complaint of bloody bowel movements and witnessed syncopal episode. Endorses bowel movements are black with mixed bright red blood. Endorses a 1 month history of bloody bowel movements but acute worsening yesterday. Patient was found by significant other with a witnessed syncopal episode while laying on sofa. Patient endorses associated mild epigastric pain, diffuse body weakness, skin pallor, and dizziness. Tried taking Pepto-Bismol at home for the diarrhea and abdominal pain but was provided little relief. Endorses pulling "large worms" out of his stools around 2 feet in length. Admits to daily IV heroin use due to chronic low back pain. Smokes 1/2 pack of cigarettes per day. Denies any alcohol use, chronic NSAID use, nausea/vomiting, abdominal distension, fever/chills.  H&P 12/25      Please further specify the acuity of the  __duodenal____ ulcer:    [   ] Acute     [ x  ] Chronic     [   ] Unspecified     [   ] Other (please specify): ___________     [   ] Clinically Undetermined       Please document in your progress notes daily for the duration of treatment until resolved, and include in your discharge summary.  Form No. [id number]    "

## 2021-12-28 NOTE — PROGRESS NOTES
"Progress Note  [facility]    Attending: Ray  Admit Date: 12/24/2021  Today's Date: 12/28/2021    HPI:  Boo Romano is a 56-year-old PMHx COPD, bipolar 1 disorder, chronic hepatitis C, IVDU (heroin) who presented to [facility] ED with complaint of bloody bowel movements and witnessed syncopal episode. Endorses bowel movements are black with mixed bright red blood. Endorses a 1 month history of bloody bowel movements but acute worsening yesterday. Patient was found by significant other with a witnessed syncopal episode while laying on sofa. Patient endorses associated mild epigastric pain, diffuse body weakness, skin pallor, and dizziness. Tried taking Pepto-Bismol at home for the diarrhea and abdominal pain but was provided little relief. Endorses pulling "large worms" out of his stools around 2 feet in length. Admits to daily IV heroin use due to chronic low back pain. Smokes 1/2 pack of cigarettes per day. Denies any alcohol use, chronic NSAID use, nausea/vomiting, abdominal distension, fever/chills.      In [facility] ED, patient hypotensive BP 97/62. CBC with anemia (hemoglobin 9.0) and leukocytosis (WBC count 18.69). Was provided 1 L bolus NS and IV Protonix 40 mg in ED. GI was consulted and patient was admitted to [facility] for GI bleed.       SUBJECTIVE:     Yesterday pt became febrile w/ Tmax 100.8. Repeat bld cultures were collected and pt was restarted on broad spectrum abx vanc and zosyn which were stopped that morning after 2 days of no growth from initial blood cultures. Sedation was also tried to be weaned yesterday however pt woke up and became agitated and tachycardic (110) along with coughing large amounts of thick white secretions. He was then resedated with previous fentanyl, ketamine, and versed but after difficulty bringing him back under it was found his IV had occluded so a new IV was placed. Pt was also started on tube feeds yesterday " (goal 50ml/hr) with no complications.     Patient seen and examined this morning was restless in bed moving and responsive to voice. Able to follow commands opening his eyes and nod/ shake head. He was extubated and put on BiPAP 10/5/30%.    Review of Systems   Unable to perform ROS: Intubated       OBJECTIVE:     Vital Signs Trends/Hx Reviewed  Vitals:    12/28/21 0003 12/28/21 0206 12/28/21 0734 12/28/21 0803   BP: (!) 156/89 (!) 149/80 (!) 157/78    BP Location:       Patient Position:       Pulse: 95 107 (!) 111 (!) 111   Resp: (!) 22 (!) 28 (!) 29 (!) 28   Temp:       TempSrc:       SpO2: 95% 95% (!) 92% (!) 94%   Weight:       Height:           Vent Mode: Spont  Oxygen Concentration (%):  [25] 25  Resp Rate Total:  [22 br/min-30 br/min] 28 br/min  Vt Set:  [480 mL] 480 mL  PEEP/CPAP:  [5 cmH20] 5 cmH20  Pressure Support:  [5 cmH20] 5 cmH20  Mean Airway Pressure:  [7.5 evD72-81.2 cmH20] 7.5 cmH20    A/CMV VC  Flow 60 RR 22 PEEP 5 FiO2 25%    Physical Exam  Vitals and nursing note reviewed.   Constitutional:       General: He is not in acute distress.     Appearance: He is not ill-appearing or toxic-appearing.   HENT:      Head: Normocephalic and atraumatic.      Right Ear: External ear normal.      Left Ear: External ear normal.      Nose: Nose normal.   Eyes:      General: No scleral icterus.     Conjunctiva/sclera: Conjunctivae normal.   Cardiovascular:      Rate and Rhythm: Normal rate and regular rhythm.      Pulses: Normal pulses.      Heart sounds: Normal heart sounds.   Pulmonary:      Effort: No respiratory distress.      Breath sounds: No wheezing.      Comments: Intubated breathing with vent  Abdominal:      General: Bowel sounds are normal.      Palpations: Abdomen is soft. There is no mass.      Tenderness: There is no abdominal tenderness.   Musculoskeletal:         General: No swelling.      Right lower leg: No edema.      Left lower leg: No edema.   Lymphadenopathy:      Cervical: No  cervical adenopathy.   Skin:     General: Skin is warm and dry.      Findings: No rash.   Neurological:      Comments: Lightly sedated. Able to follow commands and respond with head nods.         Laboratory:  Recent Labs   Lab 12/28/21  0001   HGB 8.7*   HCT 26.4*     No results for input(s): GLUCOSE, NA, K, CL, CO2, BUN, CREATININE, MG in the last 24 hours.    Invalid input(s):  CALCIUM  No results for input(s): PH, PCO2, PO2, HCO3, POCSATURATED, BE in the last 24 hours.      Chest Imaging:   No new chest imaging.     ASSESSMENT & RECOMMENDATIONS     Neuro/Psych  #agitated delerium  -Intubated and sedated on ketamine, fentanyl, and versed  -Able to wean some from ketamine and versed though pt woke up agitated and coughing. Found to have an occluded IV which was replaced and then resedated  -This AM is more responsive  -Cnt to wean sedation   #HX of polysubstance abuse  -Utox was pos for opiates and amphetamines  -Received 2 dose of 8mg suboxone 12/25 prior to anesthesia for EGD 2/2 complaints of withdrawals  -After waking from anesthesia had agitated delirium requiring sedation and intubation  -Will avoid suboxone at this time.   -CT head 12/26 w/ no acute abnormalities      CV  #HTN, tachycardia which was likely from drug use   -BP this AM while in room was 149/80  -Hydralizine PRN for SBP >180  -Elevated troponin 0.041 on admit with peak 0.054 and trended down on repeat to 0.02  -EKG from admit not uploaded  -ECHO 12/25 w/ normal LVSF LVDF and EF 65% and normal RVSF     Pulm  #Intubated for airway protection  -extubated 12/28 and put on BiPAP    Vent settings this AM as above   Most recent ABG from 12/25 pH 7.4 PCO2 35.7 Bicarb 23.7 PO2 138  No new CXR but previous w/ no acute cardiopulm issues seen  Wean vent settings as tolerated and SBT once appropriate     FEN/GI  #Acute GI bleed s/p EGD  GI did EGD (12/25) found normal esophagus and stomach but a non bleeding single crater ulcer in the duodenal bulb which  used epi and bipolar coagulation   -GI recs IV PPI BID for 72hr (end date 12/27) then oral for 8 wks   -Last BM 12/26 w/ no blood reported  Electrolytes: pending  Hx of HCV and HBV    RENAL  UOP: 5125cc , In: 3424cc, net -1700cc in last 24 hrs     BUN/Cr: Pending   Continue to monitor renal status and urine output     Heme  H/H 7.5/26 on admit but trended down to 6.7/20 so had 1u pRBC 12/26 w/ repeat H/H 8.3/24.9 this AM pending  Folate and B12 wnl  GI rec Fe studies   -Fe and Fe sat low 39 and 12 Transferrin and TIBC wnl 218 and 323 as well as Ferritin wnl 32  -Pt given 1 dose 200mg venofer 12/27  -H/H q8hr and transfuse if Hgb <7   DVT prophylaxis: SCDs     Endo  TSH on admit wnl but low 0.427   A1C 5.7   Glucose checks q6hr     ID  WBC 18 on admit and   On Vanc Zosyn for 2 days but stopped 12/27 AM; however, pt spiked fever w/ Tmax 100.8 and restarted abx  Bld cx's from admit w/ NGTD and repeated bld cx's 12/27  -Resp culture and G stain shows rare wbc, rare G+ cocci and few G neg rods  Hep C RNA, Stool ova parasite, and H pylori antigen pending  -HIV neg  -Hep panel pos for Hep C Ab    Stephen Turner M.D.  [facility]   12/28/2021 9:29 PM

## 2021-12-28 NOTE — PROGRESS NOTES
At approximately 0800 pt is awake and agitated thrashing in bed sitting up in bed, attempting to pull tube, he does nod appropriately to questions asked and follows commands, when asked does he feel like hes withdrawing he nods yes, I have notified Dr. Melendrez that pt is extremely irritated and was on max on all his sedation, she wanted to turn all sedation off, I asked about weaning and told her that the pt is detoxing and will want to leave AMA and is only at day 3 1/2 of detox. See orders for extubation.

## 2021-12-28 NOTE — PROGRESS NOTES
Pulmonology/Critical Care Progress Note:    Last 24 hours:  Patient extubate this morning. Patient alert and oriented but significantly agitated and restless and exhibiting symptoms of withdrawal. suboxone resumed but patient signed out AMA.     Objective:  Vitals:    12/28/21 0832 12/28/21 0900 12/28/21 1000 12/28/21 1002   BP:  (!) 160/84 (!) 146/85    BP Location:   Left arm    Patient Position:   Lying    Pulse: (!) 127 (!) 121 (!) 122    Resp: 19 (!) 23 (!) 35    Temp:    99.4 °F (37.4 °C)   TempSrc:       SpO2: 97% (!) 93%     Weight:       Height:       Physical Exam:  General: alert and oriented but restless and agitated.  HEENT: nonicteric, MMM, no JVD  CV: tachycardic, no murmurs, no edema  Resp: CTAB, tachypneic, no wheezing or crackles  Abd: soft, NTTP  Extr: no cyanosis or edema, no clubbing      Labs:   Recent Labs   Lab 12/28/21  0836   HGB 9.5*   HCT 29.6*     Recent Labs   Lab 12/28/21  0836   ALT 11   AST 12   ALKPHOS 93   BILITOT 0.3   PROT 7.0   ALBUMIN 3.5          Imaging: CT head 12/26:  FINDINGS:  The ventricles and the basilar cisterns are patent.  Normal gray-white matter differentiation is preserved.  No acute bleed or extra-axial collection.  There is no mass effect or shift of the midline structures.  No Chiari malformation.  Orbits and orbital contents are within normal limits.  Mucoperiosteal thickening of the ethmoid sinuses with moderate left greater than right maxillary sinusitis and mild left sphenoid sinusitis changes noted.  Mastoid air cells are clear.  Skull is intact.  Mild motion artifact is present.  No acute abnormality.  Further evaluation or follow-up could be performed if symptoms persist as clinically warranted.     Echo: 12/25:  · Normal systolic function.  · The estimated ejection fraction is 65%.  · Normal left ventricular diastolic function.  · Normal right ventricular size with normal right ventricular systolic function.  · Normal central venous pressure (3  mmHg).     Assessment:  1. Agitated delirium requiring intubation and sedation  2. GI bleed s/p intervention  3. Polysubstance use/withdrawal  4. New fever, unknown source       The patient was successfully extubated today. After extubation, patient was alert and oriented but significantly agitated and exhibiting signs and symptoms of withdrawal. Per Primary team, suboxone was administered. Later. Luna darden called as patient threatened ICU nurse. He later signed out AMA.    Thank you for allowing us to participate in the care of this patient. Please do not hesitate to contact us with further questions or concerns.     Giovanni Melendrez MD  [facility]  Pager #: [phone]

## 2021-12-28 NOTE — NURSING
At approximatly 1120 I called and updated pt's siter on his status, she is en route to facility but wont be able to get here until 1330, I explained to her that he is wanting to leave but and once the doctors assess his ability to sign himself out he will be discharged from hospital.

## 2021-12-28 NOTE — CARE UPDATE
Received call that pt desires to leave AMA. After speaking w the patient and alerting him to the risks of leaving including worsening of his conditions, bleeding, permenant organ damage, and death. Pt acknowledged these risks and was AOx3. He        Received a call that the pt desires to leave AMA. After speaking with the pt, he is AOx3. He was counseled extensively on all the risks of refusing medical treatment including but not limited to worsening of his conditions, bleeding, permenant organ damage, and death. The patient demonstrated the capacity to understand the risks of refusing our medical treatment recommendations as described. I discussed the risks of refusing my treatment recommendations in layman's terms and answered all questions. I encouraged the patient to consent to treatment. The patient understands that they may return at any time for further treatment. Patient continued to decline and continued to request to leave AMA.  The patient left AMA on 12/28/2021 at 11:38.      Stephen Turner, PGY-1  [facility]   12/28/2021 11:57 AM

## 2021-12-28 NOTE — PLAN OF CARE
No acute changes from previous assessment, attempted to wean down sedation but pt woke and became increasingly agiated with increaed  and coughing, large amount of secretions and urine output, no injury form bltrl soft wrist restraints, family visited at [facility], no questions or concerns about care, no wounds or skin breakdown, started tube feedings today per orders goal of 50ml/hr, monitoring closely.

## 2021-12-28 NOTE — NURSING
"At approximatly 1129 Dr. Stephen Turner was notified that pt wants to leave AMA, at 1130 he arrived on unit and assessed pt's cognitive status and orientation, pt is oriented x 4 and acknowledged that he understands all the risks involved with leaving against the Doctors reccomendations, I tired to encourage him to stay and asked him what we could do better to make him more comfortable after already having suboxone today and he stated he "wants to get high". Dr. Sheikh read over the AMA paperwork and the pt signed, called pt's sister again and informed her of this update, she is still en route.  "

## 2021-12-28 NOTE — DISCHARGE SUMMARY
"[facility] Medicine  Discharge Summary      Patient Name: Boo Romano  MRN: [id number]  Patient Class: IP- Inpatient  Admission Date: 12/24/2021  Hospital Length of Stay: 3 days  Discharge Date and Time:  12/28/2021 1:31 PM  Attending Physician: Luigi Bell III, MD   Discharging Provider: Stephen Turner MD  Primary Care Provider: Ervin Childress Iii, MD      HPI:   Boo Romano is a 56-year-old PMHx COPD, bipolar 1 disorder, chronic hepatitis C, IVDU (heroin) who presented to [facility] ED with complaint of bloody bowel movements and witnessed syncopal episode. Endorses bowel movements are black with mixed bright red blood. Endorses a 1 month history of bloody bowel movements but acute worsening yesterday. Patient was found by significant other with a witnessed syncopal episode while laying on sofa. Patient endorses associated mild epigastric pain, diffuse body weakness, skin pallor, and dizziness. Tried taking Pepto-Bismol at home for the diarrhea and abdominal pain but was provided little relief. Endorses pulling "large worms" out of his stools around 2 feet in length. Admits to daily IV heroin use due to chronic low back pain. Smokes 1/2 pack of cigarettes per day. Denies any alcohol use, chronic NSAID use, nausea/vomiting, abdominal distension, fever/chills.     In [facility] ED, patient hypotensive BP 97/62. CBC with anemia (hemoglobin 9.0) and leukocytosis (WBC count 18.69). Was provided 1 L bolus NS and IV Protonix 40 mg in ED. GI was consulted and patient was admitted to [facility] Family [facility] Service for GI bleed.           Procedure(s) (LRB):  EGD (ESOPHAGOGASTRODUODENOSCOPY) (N/A)      Hospital Course:   Pt was admitted to the  service for workup of GI bleed. After admission he reported that he was withdrawaling and had last used heroin just prior to admission. He was given 2 doses of suboxone prior to GI taking him for an EGD. During the EGD he was found to have 1 larger " crater single vessel non-bleeding ulcer in the duodenal bulb which was tx'd with epinephrine and bipolar coagulation. GI rec that he get Fe studies and 1u PRBC for his anemia and use IV PPI for 72hrs which ended 12/27 and then oral PPI BID for 8wks. After he woke from anesthesia for EGD pt became very agitated and thrashing 2/2 fear of lack of airway protection he was urgently intubated and sedated. He was able to be weaned from the sedation and extubated after 2 days. He again became agitated and had to have a code white called as he threatened his nurse. He was given another dose of suboxone but became increasingly agitated and wanted to leave AMA. He was educated about the risks of leaving to which he is competent and able to acknowledge and sign. His sister was able to come and pick him up. He was told to return to the ER if he develops worsening symptoms.    Goals of Care Treatment Preferences:  Code Status: Full Code      Consults:   Consults (From admission, onward)        Status Ordering Provider     Inpatient consult to Psychiatry  Once        Provider:  (Not yet assigned)    Ordered ABUNDIO RANGEL     Inpatient consult to Registered Dietitian/Nutritionist  Once        Provider:  (Not yet assigned)    Completed ABUNDIO RANGEL     Inpatient consult to Social Work  Once        Provider:  (Not yet assigned)    Acknowledged SEAN MCDANIEL III     Inpatient consult to Gastroenterology-[facility]  Once        Provider:  (Not yet assigned)    Completed JOSEMANUEL DE          No new Assessment & Plan notes have been filed under this hospital service since the last note was generated.  Service: Hospital Medicine    Final Active Diagnoses:    Diagnosis Date Noted POA    PRINCIPAL PROBLEM:  GI bleed [K92.2] 12/25/2021 Yes    Duodenal ulcer with hemorrhage [K26.4]  Yes    Chronic hepatitis C [B18.2] 12/25/2021 Yes    IVDU (intravenous drug user) [F19.90] 12/25/2021 Yes    Bipolar 1 disorder [F31.9] 12/25/2021 Yes     PUD (peptic ulcer disease) [K27.9]  Unknown    Rectal bleeding [K62.5]  Yes    Altered mental status [R41.82]  No      Problems Resolved During this Admission:       Discharged Condition: stable    Disposition: Left Against Medical Adv*    Follow Up:    Patient Instructions:   No discharge procedures on file.    Significant Diagnostic Studies:   Recent Labs   Lab 12/25/21  1559 12/25/21  1600 12/26/21  0405 12/26/21  1637 12/27/21  0303 12/27/21  0822 12/27/21  1521 12/28/21  0001 12/28/21  0836   WBC 18.30*  --  17.60*  --  15.80*  --   --   --   --    HGB 7.5*   < > 6.7*  6.7*   < > 7.9*   < > 8.5* 8.7* 9.5*   HCT 23.2*   < > 20.5*  20.5*   < > 24.3*   < > 26.2* 26.4* 29.6*   MCV 82  --  83  --  83  --   --   --   --    RBC 2.82*  --  2.46*  --  2.92*  --   --   --   --    MCH 26.6*  --  27.2  --  27.1  --   --   --   --    MCHC 32.3  --  32.7  --  32.5  --   --   --   --    RDW 14.4  --  14.6*  --  14.9*  --   --   --   --      --  221  --  258  --   --   --   --    MPV 11.7  --  11.2  --  11.0  --   --   --   --    GRAN 78.9*  14.4*   < > 70.6  12.5*  --  71.6  11.3*  --   --   --   --    LYMPH 15.7*  2.9   < > 20.2  3.6  --  18.6  2.9  --   --   --   --    MONO 4.2  0.8   < > 7.6  1.3*  --  8.3  1.3*  --   --   --   --    EOSINOPHIL 0.1  --  0.6  --  0.8  --   --   --   --    BASOPHIL 0.3  --  0.3  --  0.3  --   --   --   --     < > = values in this interval not displayed.     Recent Labs   Lab 12/25/21  0502 12/25/21  0503 12/25/21  1559 12/25/21  1559 12/26/21  0405 12/27/21  0303 12/28/21  0836   NA   < >  --  137   < > 137 132* 140   K   < >  --  4.2   < > 3.5 3.8 3.9   CL   < >  --  107   < > 107 102 107   CO2   < >  --  19*   < > 22* 21* 19*   ANIONGAP   < >  --  11   < > 8 9 14   BUN   < >  --  22*   < > 15 7 7   CREATININE   < >  --  0.8   < > 0.8 0.8 0.9   GLU   < >  --  156*   < > 109 100 109   CALCIUM   < >  --  8.3*   < > 8.2* 8.5* 9.3   PROT   < >  --  5.8*   < > 5.3* 5.9*  7.0   ALBUMIN   < >  --  3.3*   < > 3.0* 3.1* 3.5   ALKPHOS   < >  --  74   < > 69 76 93   BILITOT   < >  --  0.2   < > 0.3 0.3 0.3   ALT   < >  --  17   < > 14 13 11   AST   < >  --  14   < > 12 11 12   ESTGFRAFRICA   < >  --  >60   < > >60 >60 >60   EGFRNONAA   < >  --  >60   < > >60 >60 >60   MG  --  2.0 1.8  --   --   --   --    PHOS  --  3.4 3.2  --   --   --   --     < > = values in this interval not displayed.     No results for input(s): COLORU, APPEARANCEUA, PHUR, SPECGRAV, PROTEINUA, GLUCUA, KETONESU, BILIRUBINUA, OCCULTUA, UROBILINOGEN, NITRITE, LEUKOCYTESUR, RBCUA, WBCUA, BACTERIA, SQUAMEPITHEL, HYALINECASTS, GRANULARCAST, MICROCMT in the last 168 hours.    Invalid input(s): SPECIMENU, AMORPHOUSU  Recent Labs   Lab 12/25/21  1559 12/25/21  2210 12/26/21  1637   TROPONINI 0.041* 0.054* 0.020   CPK 51  --  46     Recent Labs   Lab 12/24/21  2308   INR 1.1     Recent Labs   Lab 12/25/21  0503   TSH 0.427     X-Ray Chest 1 View    Result Date: 12/25/2021  EXAMINATION: XR CHEST 1 VIEW CLINICAL HISTORY: Misplaced ET tube; TECHNIQUE: Single frontal view of the chest was performed. COMPARISON: Chest radiograph from earlier the same date taken at 15:14. FINDINGS: Endotracheal tube terminates approximately 3 cm proximal to the eunice.  Nasogastric tube courses into the abdomen The lungs are well expanded and clear. No focal opacities are seen. The pleural spaces are clear.  The costophrenic angles are not entirely included within the field of view. The cardiac silhouette is unremarkable. The visualized osseous structures are unremarkable.     No acute cardiopulmonary abnormality. Electronically signed by: Conner Jim Date:    12/25/2021 Time:    20:30    X-Ray Chest 1 View    Result Date: 12/25/2021  EXAMINATION: XR CHEST 1 VIEW CLINICAL HISTORY: emergent intubation; TECHNIQUE: Single frontal view of the chest was performed. COMPARISON: Chest x-ray 12/25/2021 FINDINGS: Endotracheal tube terminates 5 cm above the  eunice.  Esophagogastric tube has its side hole projecting about the proximal stomach with the distal aspect coursing within the stomach and the tip terminating at the midline.  Lungs appear clear.  Bowel-gas pattern is nonspecific.  No clear evidence for obstruction although abdomen is only partially imaged.  Bones appear intact.     As above Electronically signed by: Adiel Pandey MD Date:    12/25/2021 Time:    15:36    CT Head Without Contrast    Result Date: 12/26/2021  EXAMINATION: CT HEAD WITHOUT CONTRAST CLINICAL HISTORY: Mental status change, unknown cause; TECHNIQUE: Low dose axial images were obtained through the head.  Coronal and sagittal reformations were also performed. Contrast was not administered. COMPARISON: None. FINDINGS: The ventricles and the basilar cisterns are patent.  Normal gray-white matter differentiation is preserved.  No acute bleed or extra-axial collection.  There is no mass effect or shift of the midline structures.  No Chiari malformation.  Orbits and orbital contents are within normal limits.  Mucoperiosteal thickening of the ethmoid sinuses with moderate left greater than right maxillary sinusitis and mild left sphenoid sinusitis changes noted.  Mastoid air cells are clear.  Skull is intact.  Mild motion artifact is present.  No acute abnormality.  Further evaluation or follow-up could be performed if symptoms persist as clinically warranted.     No acute findings.  Ancillary findings as above. Electronically signed by: Adiel Pandey MD Date:    12/26/2021 Time:    11:41    X-Ray Chest AP Portable    Addendum Date: 12/25/2021    Please disregard this study which was performed on the wrong patient. Electronically signed by: Conner Jim Date:    12/25/2021 Time:    20:36    Result Date: 12/25/2021  EXAMINATION: XR CHEST AP PORTABLE CLINICAL HISTORY: Misplaced ET tube; TECHNIQUE: Single frontal view of the chest was performed. COMPARISON: Chest x-ray, 12/25/2021 at 15:14 hours  FINDINGS: ET tube and NG tube appear stable.  Endotracheal tube is 2.8 cm from the eunice.  New defibrillator pads are present over the left chest.  Mild prominence of the interstitium and hypoventilation is present.  No pneumothorax is evident.  Pacemaker leads are stable.  EKG leads remain.     New defibrillator pads over the chest with hypoventilation and mild interstitial edema. Electronically signed by: Shawn Smallwood Date:    12/25/2021 Time:    19:52    X-Ray Chest AP Portable    Result Date: 12/25/2021  EXAMINATION: XR CHEST AP PORTABLE CLINICAL HISTORY: sob; TECHNIQUE: Single frontal view of the chest was performed. COMPARISON: Chest x-ray from 04/18/2018 FINDINGS: EKG leads overlie chest.  Lungs are clear.  Cardiomediastinal silhouette is not enlarged.  Osseous structures appear intact.     Stable chest radiograph.  No active disease. Electronically signed by: Adiel Pandey MD Date:    12/25/2021 Time:    08:20    Echo    Result Date: 12/25/2021  · Normal systolic function. · The estimated ejection fraction is 65%. · Normal left ventricular diastolic function. · Normal right ventricular size with normal right ventricular systolic function. · Normal central venous pressure (3 mmHg).      Microbiology Results (last 7 days)     Procedure Component Value Units Date/Time    Blood culture [[id number]] Collected: 12/25/21 0502    Order Status: Completed Specimen: Blood Updated: 12/28/21 1012     Blood Culture, Routine No Growth to date      No Growth to date      No Growth to date      No Growth to date    Blood culture [[id number]] Collected: 12/25/21 0502    Order Status: Completed Specimen: Blood Updated: 12/28/21 1012     Blood Culture, Routine No Growth to date      No Growth to date      No Growth to date      No Growth to date    Culture, Respiratory with Gram Stain [[id number]] Collected: 12/27/21 1142    Order Status: Completed Specimen: Respiratory from Tracheal Aspirate Updated: 12/28/21 0840      Respiratory Culture Insufficient incubation, culture in progress     Gram Stain (Respiratory) <10 epithelial cells per low power field.     Gram Stain (Respiratory) Rare WBC's     Gram Stain (Respiratory) Rare Gram positive cocci     Gram Stain (Respiratory) Few Gram negative rods    Blood culture [[id number]] Collected: 12/27/21 1011    Order Status: Completed Specimen: Blood Updated: 12/28/21 0115     Blood Culture, Routine No Growth to date            Pending Diagnostic Studies:     Procedure Component Value Units Date/Time    Hematocrit [[id number]]     Order Status: Sent Lab Status: No result     Specimen: Blood     Hemoglobin [[id number]]     Order Status: Sent Lab Status: No result     Specimen: Blood     Hepatitis C RNA, quantitative, PCR [[id number]] Collected: 12/25/21 1003    Order Status: Sent Lab Status: In process Updated: 12/25/21 1003    Specimen: Blood     Stool Exam-Ova,Cysts,Parasites [[id number]] Collected: 12/25/21 1019    Order Status: Sent Lab Status: In process Updated: 12/25/21 1830    Specimen: Stool          Medications:  Reconciled Home Medications:      Medication List      CONTINUE taking these medications    albendazole 200 mg Tab  Commonly known as: ALBENZA  Take 2 tablets (400 mg total) by mouth 2 (two) times a day for 4 days     albuterol 0.63 mg/3 mL Nebu  Commonly known as: ACCUNEB  Take 3 mLs (0.63 mg total) by nebulization every 6 (six) hours as needed. Rescue     HYDROcodone-acetaminophen 7.5-300 mg Tab  Take 1 tablet by mouth once. Received from a friend            Indwelling Lines/Drains at time of discharge:   Lines/Drains/Airways     Drain                 Urethral Catheter 12/25/21 1532 Latex 14 Fr. 2 days          Airway                 Airway - Non-Surgical 12/25/21 1455 Endotracheal Tube 2 days                Time spent on the discharge of patient: 35 minutes    Critical care time spent on the evaluation and treatment of severe organ dysfunction, review of pertinent labs and  imaging studies, discussions with consulting providers and discussions with patient/family: 35 minutes.     Stephen Turner MD  Department of [facility] Medicine  [place] [facility]

## 2021-12-28 NOTE — NURSING
"At approximatly 1045 pt got up from bed, wripped all leads off, and was wobbling back and forth, he yelled that he needed to go to the bathroom, I asked him to sit back down and I would get a bedisde toilet to use, He stood up again from the bed and forced his way over to the bathroom using his body to push me towards the wall, and threatened to "punch me in the face" if I did not get out of his way, I backed out of the way and he pulled the monitrs down and walked to the bathroom, I called for help and Mike came to [facility] and code white was called, pt began making threatening statements to Mike and the other staff, security came to bedside along with the HCp teams and the nurse supervisor, pt had a bowel movement no blood noted, walked back to bed and team administered meds as ordered, pt stated he wanted to leave now but after talking with him more he agreed to stay if we can treat his pain and withdraws, back in bed now, thrashing about with leg pain, upper side rails up, bed lowest to floor wheels locked, room door open, monitoring closely.  "

## 2021-12-28 NOTE — PROGRESS NOTES
Therapy with vancomycin complete and/or consult discontinued by provider.  Pharmacy will sign off, please re-consult as needed.    Liyah bAraham, PharmD, BCPS  Clinical Pharmacist  [phone]

## 2021-12-28 NOTE — NURSING
At approximately 1225 Boo's brother Yaron arrived to take pt home, Boo would not allow us to provide him with paper scrubs and aggressively left his room into the unit while naked and holding a blanket, I was able to get a gown on him before he left the unit, I had called security to come to unit when Boo began to leave but they were not here in time, Boo refused to take any education or post medications or appointment info, notified charge nurse.

## 2021-12-28 NOTE — CONSULTS
RD consulted for tube feed recs.     Per chart pt has left AMA.    Please re-consult if pt is to return.    Thank you.

## 2021-12-29 LAB
BACTERIA SPEC AEROBE CULT: NORMAL
BACTERIA SPEC AEROBE CULT: NORMAL
GRAM STN SPEC: NORMAL

## 2021-12-30 LAB
BACTERIA BLD CULT: NORMAL
BACTERIA BLD CULT: NORMAL

## 2022-01-01 LAB — BACTERIA BLD CULT: NORMAL

## 2022-08-22 ENCOUNTER — HOSPITAL ENCOUNTER (EMERGENCY)
Facility: HOSPITAL | Age: 57
Discharge: HOME | End: 2022-08-22
Attending: FAMILY MEDICINE
Payer: MEDICARE

## 2022-08-22 VITALS
SYSTOLIC BLOOD PRESSURE: 140 MMHG | BODY MASS INDEX: 30.8 KG/M2 | OXYGEN SATURATION: 98 % | HEART RATE: 88 BPM | TEMPERATURE: 99 F | DIASTOLIC BLOOD PRESSURE: 77 MMHG | HEIGHT: 74 IN | RESPIRATION RATE: 18 BRPM | WEIGHT: 240 LBS

## 2022-08-22 DIAGNOSIS — M79.89 LEG SWELLING: ICD-10-CM

## 2022-08-22 DIAGNOSIS — L03.116 CELLULITIS OF LEFT LOWER EXTREMITY: Primary | ICD-10-CM

## 2022-08-22 LAB
ALBUMIN SERPL BCP-MCNC: 4.1 G/DL (ref 3.5–5.2)
ALP SERPL-CCNC: 113 U/L (ref 38–126)
ALT SERPL W/O P-5'-P-CCNC: 28 U/L (ref 10–44)
ANION GAP SERPL CALC-SCNC: 11 MMOL/L (ref 8–16)
AST SERPL-CCNC: 27 U/L (ref 15–46)
BASOPHILS # BLD AUTO: 0.04 K/UL (ref 0–0.2)
BASOPHILS NFR BLD: 0.4 % (ref 0–1.9)
BILIRUB SERPL-MCNC: 0.4 MG/DL (ref 0.1–1)
CALCIUM SERPL-MCNC: 9 MG/DL (ref 8.7–10.5)
CHLORIDE SERPL-SCNC: 102 MMOL/L (ref 95–110)
CO2 SERPL-SCNC: 27 MMOL/L (ref 23–29)
CREAT SERPL-MCNC: 0.89 MG/DL (ref 0.5–1.4)
DIFFERENTIAL METHOD: ABNORMAL
EOSINOPHIL # BLD AUTO: 0.3 K/UL (ref 0–0.5)
EOSINOPHIL NFR BLD: 2.5 % (ref 0–8)
ERYTHROCYTE [DISTWIDTH] IN BLOOD BY AUTOMATED COUNT: 15.3 % (ref 11.5–14.5)
EST. GFR  (NO RACE VARIABLE): >60 ML/MIN/1.73 M^2
GLUCOSE SERPL-MCNC: 160 MG/DL (ref 70–110)
HCT VFR BLD AUTO: 38.6 % (ref 40–54)
HGB BLD-MCNC: 11.7 G/DL (ref 14–18)
IMM GRANULOCYTES # BLD AUTO: 0.05 K/UL (ref 0–0.04)
IMM GRANULOCYTES NFR BLD AUTO: 0.4 % (ref 0–0.5)
LYMPHOCYTES # BLD AUTO: 2.7 K/UL (ref 1–4.8)
LYMPHOCYTES NFR BLD: 23.8 % (ref 18–48)
MCH RBC QN AUTO: 24.3 PG (ref 27–31)
MCHC RBC AUTO-ENTMCNC: 30.3 G/DL (ref 32–36)
MCV RBC AUTO: 80 FL (ref 82–98)
MONOCYTES # BLD AUTO: 1.2 K/UL (ref 0.3–1)
MONOCYTES NFR BLD: 10.8 % (ref 4–15)
NEUTROPHILS # BLD AUTO: 7.1 K/UL (ref 1.8–7.7)
NEUTROPHILS NFR BLD: 62.1 % (ref 38–73)
NRBC BLD-RTO: 0 /100 WBC
PLATELET # BLD AUTO: 288 K/UL (ref 150–450)
PMV BLD AUTO: 10.6 FL (ref 9.2–12.9)
POTASSIUM SERPL-SCNC: 4.2 MMOL/L (ref 3.5–5.1)
PROT SERPL-MCNC: 7.6 G/DL (ref 6–8.4)
RBC # BLD AUTO: 4.81 M/UL (ref 4.6–6.2)
SODIUM SERPL-SCNC: 140 MMOL/L (ref 136–145)
UUN UR-MCNC: 28 MG/DL (ref 2–20)
WBC # BLD AUTO: 11.42 K/UL (ref 3.9–12.7)

## 2022-08-22 PROCEDURE — 25000003 PHARM REV CODE 250: Mod: ER | Performed by: FAMILY MEDICINE

## 2022-08-22 PROCEDURE — 99285 EMERGENCY DEPT VISIT HI MDM: CPT | Mod: 25,ER

## 2022-08-22 PROCEDURE — 96376 TX/PRO/DX INJ SAME DRUG ADON: CPT | Mod: ER

## 2022-08-22 PROCEDURE — 87040 BLOOD CULTURE FOR BACTERIA: CPT | Mod: 59,ER | Performed by: FAMILY MEDICINE

## 2022-08-22 PROCEDURE — 80053 COMPREHEN METABOLIC PANEL: CPT | Mod: ER | Performed by: FAMILY MEDICINE

## 2022-08-22 PROCEDURE — 96375 TX/PRO/DX INJ NEW DRUG ADDON: CPT | Mod: ER

## 2022-08-22 PROCEDURE — 63600175 PHARM REV CODE 636 W HCPCS: Mod: ER | Performed by: FAMILY MEDICINE

## 2022-08-22 PROCEDURE — 96365 THER/PROPH/DIAG IV INF INIT: CPT | Mod: ER

## 2022-08-22 PROCEDURE — 85025 COMPLETE CBC W/AUTO DIFF WBC: CPT | Mod: ER | Performed by: FAMILY MEDICINE

## 2022-08-22 PROCEDURE — 87077 CULTURE AEROBIC IDENTIFY: CPT | Mod: ER | Performed by: FAMILY MEDICINE

## 2022-08-22 PROCEDURE — 87186 SC STD MICRODIL/AGAR DIL: CPT | Mod: 59,ER | Performed by: FAMILY MEDICINE

## 2022-08-22 RX ORDER — KETOROLAC TROMETHAMINE 30 MG/ML
15 INJECTION, SOLUTION INTRAMUSCULAR; INTRAVENOUS
Status: COMPLETED | OUTPATIENT
Start: 2022-08-22 | End: 2022-08-22

## 2022-08-22 RX ORDER — CLINDAMYCIN PHOSPHATE 900 MG/50ML
900 INJECTION, SOLUTION INTRAVENOUS
Status: COMPLETED | OUTPATIENT
Start: 2022-08-22 | End: 2022-08-22

## 2022-08-22 RX ORDER — NAPROXEN 500 MG/1
500 TABLET ORAL 2 TIMES DAILY PRN
Qty: 20 TABLET | Refills: 0 | OUTPATIENT
Start: 2022-08-22 | End: 2023-05-02

## 2022-08-22 RX ORDER — CLINDAMYCIN HYDROCHLORIDE 300 MG/1
300 CAPSULE ORAL EVERY 8 HOURS
Qty: 30 CAPSULE | Refills: 0 | OUTPATIENT
Start: 2022-08-22 | End: 2023-05-02

## 2022-08-22 RX ADMIN — KETOROLAC TROMETHAMINE 15 MG: 30 INJECTION, SOLUTION INTRAMUSCULAR at 08:08

## 2022-08-22 RX ADMIN — CLINDAMYCIN IN 5 PERCENT DEXTROSE 900 MG: 18 INJECTION, SOLUTION INTRAVENOUS at 07:08

## 2022-08-22 RX ADMIN — KETOROLAC TROMETHAMINE 15 MG: 30 INJECTION, SOLUTION INTRAMUSCULAR at 07:08

## 2022-08-22 NOTE — ED PROVIDER NOTES
"Encounter Date: 8/22/2022       History     Chief Complaint   Patient presents with    Insect Bite     Pt to the ER with left lower leg swelling and redness. Pt states he thinks he has been "bitten 3 times in the last week by spiders in the same leg." After the first "bite," pt states he took "some Amoxicillin" he had at home. Pt reports last dose Ibuprofen 2am.      56-year-old male complains of left lower leg insect bite last week and since then swollen and redness seen.  Started taking amoxicillin in between got better but again thinks another spider bit him and got worse.  Denies fever.  No chills or rigors.    The history is provided by the patient.     Review of patient's allergies indicates:  No Known Allergies  Past Medical History:   Diagnosis Date    Bipolar 1 disorder     Chronic hepatitis C 12/25/2021    COPD (chronic obstructive pulmonary disease)     Encounter for blood transfusion      Past Surgical History:   Procedure Laterality Date    ESOPHAGOGASTRODUODENOSCOPY N/A 12/25/2021    Procedure: EGD (ESOPHAGOGASTRODUODENOSCOPY);  Surgeon: Scotty Thomas MD;  Location: [facility];  Service: Endoscopy;  Laterality: N/A;    ESOPHAGOGASTRODUODENOSCOPY N/A 12/25/2021    Procedure: EGD (ESOPHAGOGASTRODUODENOSCOPY);  Surgeon: Scotty Thomas MD;  Location: [facility];  Service: Endoscopy;  Laterality: N/A;    UPPER GASTROINTESTINAL ENDOSCOPY  12/25/2021     History reviewed. No pertinent family history.  Social History     Tobacco Use    Smoking status: Current Every Day Smoker     Packs/day: 1.00     Years: 40.00     Pack years: 40.00     Types: Cigarettes    Smokeless tobacco: Never Used   Substance Use Topics    Alcohol use: No    Drug use: Not Currently     Types: Methamphetamines     Review of Systems   Constitutional: Negative for fever.   HENT: Negative for sore throat.    Respiratory: Negative for shortness of breath.    Cardiovascular: Negative for chest pain. "   Gastrointestinal: Negative for nausea.   Genitourinary: Negative for dysuria.   Musculoskeletal: Negative for back pain.   Skin: Negative for rash.   Neurological: Negative for weakness.   Hematological: Does not bruise/bleed easily.   All other systems reviewed and are negative.      Physical Exam     Initial Vitals [08/22/22 0620]   BP Pulse Resp Temp SpO2   123/82 105 18 98.5 °F (36.9 °C) 98 %      MAP       --         Physical Exam    Nursing note and vitals reviewed.  Constitutional: Vital signs are normal. He appears well-developed and well-nourished. He is active. No distress.   HENT:   Head: Normocephalic.   Nose: Nose normal.   Mouth/Throat: Oropharynx is clear and moist and mucous membranes are normal.   Eyes: Conjunctivae, EOM and lids are normal.   Neck: Neck supple.   Normal range of motion.  Cardiovascular: Normal rate, regular rhythm, S1 normal, S2 normal and normal heart sounds.   Pulmonary/Chest: Breath sounds normal. No respiratory distress. He has no wheezes. He has no rhonchi. He has no rales.   Abdominal: Abdomen is soft. Bowel sounds are normal.   Musculoskeletal:         General: Normal range of motion.      Right upper arm: Normal.      Left upper arm: Normal.      Cervical back: Normal range of motion and neck supple.      Right lower leg: Normal.      Left lower leg: Normal.     Neurological: He is alert and oriented to person, place, and time. He has normal strength. GCS score is 15. GCS eye subscore is 4. GCS verbal subscore is 5. GCS motor subscore is 6.   Skin: Skin is warm. Capillary refill takes less than 2 seconds.   Left lower leg swelling, erythema.  Erythema in lower 1/3 of the leg.  Normal distal pulses.  Normal sensation.   Psychiatric: He has a normal mood and affect. His speech is normal and behavior is normal. Thought content normal. Cognition and memory are normal.         ED Course   Procedures  Labs Reviewed   CBC W/ AUTO DIFFERENTIAL - Abnormal; Notable for the  following components:       Result Value    Hemoglobin 11.7 (*)     Hematocrit 38.6 (*)     MCV 80 (*)     MCH 24.3 (*)     MCHC 30.3 (*)     RDW 15.3 (*)     Immature Grans (Abs) 0.05 (*)     Mono # 1.2 (*)     All other components within normal limits   COMPREHENSIVE METABOLIC PANEL - Abnormal; Notable for the following components:    Glucose 160 (*)     BUN 28 (*)     All other components within normal limits   CULTURE, BLOOD   CULTURE, BLOOD          Imaging Results          US Lower Extremity Veins Left (Final result)  Result time 08/22/22 08:19:33    Final result by Liyah Olmos MD (08/22/22 08:19:33)                 Impression:      No evidence of deep venous thrombosis in the left lower extremity.      Electronically signed by: Liyah Olmos  Date:    08/22/2022  Time:    08:19             Narrative:    EXAMINATION:  US LOWER EXTREMITY VEINS LEFT    CLINICAL HISTORY:  Other specified soft tissue disorders    TECHNIQUE:  Duplex and color flow Doppler evaluation and graded compression of the left lower extremity veins was performed.    COMPARISON:  None    FINDINGS:  Left thigh veins: The common femoral, femoral, popliteal, upper greater saphenous, and deep femoral veins are patent and free of thrombus. The veins are normally compressible and have normal phasic flow and augmentation response.    Left calf veins: The visualized calf veins are patent.    Contralateral CFV: The contralateral (right) common femoral vein is patent and free of thrombus.    Miscellaneous: None                                 Medications   ketorolac injection 15 mg (has no administration in time range)   clindamycin in D5W 900 mg/50 mL IVPB 900 mg (0 mg Intravenous Stopped 8/22/22 0825)   ketorolac injection 15 mg (15 mg Intravenous Given 8/22/22 0730)     Medical Decision Making:   Initial Assessment:   Left lower leg cellulitis.  Will get labs antibiotics and rule out DVT.  By ultrasound.  Differential Diagnosis:    Cellulitis, insect bite, DVT.  Clinical Tests:   Lab Tests: Ordered and Reviewed  ED Management:  Normal white cell count.  Negative for DVT.  Toradol for pain.  Clindamycin prescription.  Ibuprofen as needed.  Follow-up primary care physician in 2 days for evaluation.  ED with any worsening symptoms.                      Clinical Impression:   Final diagnoses:  [M79.89] Leg swelling  [L03.116] Cellulitis of left lower extremity (Primary)          ED Disposition Condition    Discharge Stable        ED Prescriptions     Medication Sig Dispense Start Date End Date Auth. Provider    clindamycin (CLEOCIN) 300 MG capsule Take 1 capsule (300 mg total) by mouth every 8 (eight) hours. 30 capsule 8/22/2022  German Renteria MD    naproxen (NAPROSYN) 500 MG tablet Take 1 tablet (500 mg total) by mouth 2 (two) times daily as needed (pain). 20 tablet 8/22/2022  German Renteria MD        Follow-up Information     Follow up With Specialties Details Why Contact Info    Ervin Childress III, MD Family Medicine Schedule an appointment as soon as possible for a visit in 2 days For  re-check [address] [place] [id number] [phone]             German Renteria MD  08/22/22 5279

## 2022-08-22 NOTE — ED NOTES
Physical Assessment    LOC: The patient is awake, alert and aware of environment with an appropriate affect, the patient is oriented x 3 and speaking appropriately.     Psych: Patient is calm and cooperative with good eye contact.    APPEARANCE: Patient is clean and non toxic appearing    NEUROLOGIC:  Follows commands without difficulty. Speech is clear. No neuro deficits observed.    HEENT: Moist mucus membranes, No ENT complaints.     RESPIRATORY: Airway is open and patent, respirations are spontaneous; patient has a normal effort and rate.     CARDIAC: Patient has a normal rate and rhythm.    GI/ : Soft and non tender.     MUSCULOSKELETAL:  Normal range of motion noted. Moves all extremities well, no c/o pain, no deformity noted.    SKIN: The skin is warm, dry and intact. Left lower leg >than right, red/ dominga in color, and tender to pain.

## 2022-08-25 LAB
BACTERIA BLD CULT: ABNORMAL

## 2023-05-01 ENCOUNTER — HOSPITAL ENCOUNTER (EMERGENCY)
Facility: HOSPITAL | Age: 58
Discharge: HOME | End: 2023-05-02
Attending: EMERGENCY MEDICINE
Payer: MEDICARE

## 2023-05-01 DIAGNOSIS — R10.10 UPPER ABDOMINAL PAIN: ICD-10-CM

## 2023-05-01 DIAGNOSIS — K62.5 RECTAL BLEED: ICD-10-CM

## 2023-05-01 DIAGNOSIS — Z87.19 HISTORY OF GI BLEED: Primary | ICD-10-CM

## 2023-05-01 LAB
ALBUMIN SERPL BCP-MCNC: 4.4 G/DL (ref 3.5–5.2)
ALP SERPL-CCNC: 98 U/L (ref 38–126)
ALT SERPL W/O P-5'-P-CCNC: 29 U/L (ref 10–44)
ANION GAP SERPL CALC-SCNC: 9 MMOL/L (ref 8–16)
AST SERPL-CCNC: 21 U/L (ref 15–46)
BASOPHILS # BLD AUTO: 0.04 K/UL (ref 0–0.2)
BASOPHILS NFR BLD: 0.3 % (ref 0–1.9)
BILIRUB SERPL-MCNC: 0.5 MG/DL (ref 0.1–1)
CALCIUM SERPL-MCNC: 9.3 MG/DL (ref 8.7–10.5)
CHLORIDE SERPL-SCNC: 109 MMOL/L (ref 95–110)
CO2 SERPL-SCNC: 22 MMOL/L (ref 23–29)
CREAT SERPL-MCNC: 0.84 MG/DL (ref 0.5–1.4)
DIFFERENTIAL METHOD: ABNORMAL
EOSINOPHIL # BLD AUTO: 0.2 K/UL (ref 0–0.5)
EOSINOPHIL NFR BLD: 1.2 % (ref 0–8)
ERYTHROCYTE [DISTWIDTH] IN BLOOD BY AUTOMATED COUNT: 15.4 % (ref 11.5–14.5)
EST. GFR  (NO RACE VARIABLE): >60 ML/MIN/1.73 M^2
ETHANOL SERPL-MCNC: <10 MG/DL
GLUCOSE SERPL-MCNC: 118 MG/DL (ref 70–110)
HCT VFR BLD AUTO: 42.9 % (ref 40–54)
HGB BLD-MCNC: 14.3 G/DL (ref 14–18)
IMM GRANULOCYTES # BLD AUTO: 0.04 K/UL (ref 0–0.04)
IMM GRANULOCYTES NFR BLD AUTO: 0.3 % (ref 0–0.5)
LIPASE SERPL-CCNC: 70 U/L (ref 23–300)
LYMPHOCYTES # BLD AUTO: 3.7 K/UL (ref 1–4.8)
LYMPHOCYTES NFR BLD: 28.8 % (ref 18–48)
MCH RBC QN AUTO: 27.2 PG (ref 27–31)
MCHC RBC AUTO-ENTMCNC: 33.3 G/DL (ref 32–36)
MCV RBC AUTO: 82 FL (ref 82–98)
MONOCYTES # BLD AUTO: 0.9 K/UL (ref 0.3–1)
MONOCYTES NFR BLD: 7.3 % (ref 4–15)
NEUTROPHILS # BLD AUTO: 7.9 K/UL (ref 1.8–7.7)
NEUTROPHILS NFR BLD: 62.1 % (ref 38–73)
NRBC BLD-RTO: 0 /100 WBC
PLATELET # BLD AUTO: 325 K/UL (ref 150–450)
PMV BLD AUTO: 10.7 FL (ref 9.2–12.9)
POTASSIUM SERPL-SCNC: 3.5 MMOL/L (ref 3.5–5.1)
PROT SERPL-MCNC: 7.5 G/DL (ref 6–8.4)
RBC # BLD AUTO: 5.26 M/UL (ref 4.6–6.2)
SODIUM SERPL-SCNC: 140 MMOL/L (ref 136–145)
UUN UR-MCNC: 13 MG/DL (ref 2–20)
WBC # BLD AUTO: 12.77 K/UL (ref 3.9–12.7)

## 2023-05-01 PROCEDURE — 93005 ELECTROCARDIOGRAM TRACING: CPT | Mod: ER

## 2023-05-01 PROCEDURE — 99285 EMERGENCY DEPT VISIT HI MDM: CPT | Mod: 25,ER

## 2023-05-01 PROCEDURE — 93010 EKG 12-LEAD: ICD-10-PCS | Mod: ,,, | Performed by: INTERNAL MEDICINE

## 2023-05-01 PROCEDURE — 80053 COMPREHEN METABOLIC PANEL: CPT | Mod: ER | Performed by: EMERGENCY MEDICINE

## 2023-05-01 PROCEDURE — 85025 COMPLETE CBC W/AUTO DIFF WBC: CPT | Mod: ER | Performed by: EMERGENCY MEDICINE

## 2023-05-01 PROCEDURE — 83690 ASSAY OF LIPASE: CPT | Mod: ER | Performed by: EMERGENCY MEDICINE

## 2023-05-01 PROCEDURE — 82077 ASSAY SPEC XCP UR&BREATH IA: CPT | Mod: ER | Performed by: EMERGENCY MEDICINE

## 2023-05-01 PROCEDURE — 93010 ELECTROCARDIOGRAM REPORT: CPT | Mod: ,,, | Performed by: INTERNAL MEDICINE

## 2023-05-01 RX ORDER — LIDOCAINE HYDROCHLORIDE 20 MG/ML
15 SOLUTION OROPHARYNGEAL ONCE
Status: COMPLETED | OUTPATIENT
Start: 2023-05-02 | End: 2023-05-01

## 2023-05-01 RX ORDER — MAG HYDROX/ALUMINUM HYD/SIMETH 200-200-20
30 SUSPENSION, ORAL (FINAL DOSE FORM) ORAL ONCE
Status: COMPLETED | OUTPATIENT
Start: 2023-05-02 | End: 2023-05-01

## 2023-05-01 RX ADMIN — LIDOCAINE HYDROCHLORIDE 15 ML: 20 SOLUTION ORAL; TOPICAL at 11:05

## 2023-05-01 RX ADMIN — ALUMINUM HYDROXIDE, MAGNESIUM HYDROXIDE, AND SIMETHICONE 30 ML: 200; 200; 20 SUSPENSION ORAL at 11:05

## 2023-05-02 VITALS
TEMPERATURE: 98 F | DIASTOLIC BLOOD PRESSURE: 86 MMHG | BODY MASS INDEX: 28.89 KG/M2 | SYSTOLIC BLOOD PRESSURE: 157 MMHG | WEIGHT: 225 LBS | RESPIRATION RATE: 18 BRPM | OXYGEN SATURATION: 98 % | HEART RATE: 96 BPM

## 2023-05-02 LAB
AMPHET+METHAMPHET UR QL: NEGATIVE
BARBITURATES UR QL SCN>200 NG/ML: NEGATIVE
BENZODIAZ UR QL SCN>200 NG/ML: NEGATIVE
BZE UR QL SCN: NEGATIVE
CANNABINOIDS UR QL SCN: NEGATIVE
CREAT UR-MCNC: 336.8 MG/DL (ref 23–375)
METHADONE UR QL SCN>300 NG/ML: NEGATIVE
OPIATES UR QL SCN: NEGATIVE
PCP UR QL SCN>25 NG/ML: NEGATIVE
TOXICOLOGY INFORMATION: NORMAL

## 2023-05-02 PROCEDURE — 25000003 PHARM REV CODE 250: Mod: ER | Performed by: EMERGENCY MEDICINE

## 2023-05-02 PROCEDURE — 80307 DRUG TEST PRSMV CHEM ANLYZR: CPT | Mod: ER | Performed by: EMERGENCY MEDICINE

## 2023-05-02 RX ORDER — PANTOPRAZOLE SODIUM 20 MG/1
20 TABLET, DELAYED RELEASE ORAL DAILY
Qty: 30 TABLET | Refills: 0 | Status: SHIPPED | OUTPATIENT
Start: 2023-05-02 | End: 2023-06-01

## 2023-05-02 NOTE — ED PROVIDER NOTES
Encounter Date: 5/1/2023       History     Chief Complaint   Patient presents with    Abdominal Pain     Reports abdominal pain that began 2 days ago. +nausea/vomiting Hx of stomach ulcers Pt reports blood in stool and vomit. 4mg Zofran given in route to ED     HPI  57 y.o.    BIBEMS from incarcerated (x 5 weeks) for 2 days of nausea vomiting and bloody stools, has h/o ulcers  No AC, no heavy nsaid use  Denies alcohol hx  Denies drug use  Chart review shows h/o GI bleeding, mild, was found to have nonbleeding ulcer which was tx'd 2021    Review of patient's allergies indicates:  No Known Allergies  Past Medical History:   Diagnosis Date    Bipolar 1 disorder     Chronic hepatitis C 12/25/2021    COPD (chronic obstructive pulmonary disease)     Encounter for blood transfusion      Past Surgical History:   Procedure Laterality Date    ESOPHAGOGASTRODUODENOSCOPY N/A 12/25/2021    Procedure: EGD (ESOPHAGOGASTRODUODENOSCOPY);  Surgeon: Scotty Thomas MD;  Location: [facility];  Service: Endoscopy;  Laterality: N/A;    ESOPHAGOGASTRODUODENOSCOPY N/A 12/25/2021    Procedure: EGD (ESOPHAGOGASTRODUODENOSCOPY);  Surgeon: Scotty Thomas MD;  Location: [facility];  Service: Endoscopy;  Laterality: N/A;    UPPER GASTROINTESTINAL ENDOSCOPY  12/25/2021     History reviewed. No pertinent family history.  Social History     Tobacco Use    Smoking status: Every Day     Packs/day: 1.00     Years: 40.00     Pack years: 40.00     Types: Cigarettes    Smokeless tobacco: Never   Substance Use Topics    Alcohol use: No    Drug use: Not Currently     Types: Methamphetamines     Review of Systems  All systems were reviewed/examined and were negative except as noted in the HPI.    Physical Exam     Initial Vitals   BP Pulse Resp Temp SpO2   05/01/23 2314 05/01/23 2314 05/01/23 2314 05/01/23 2316 05/01/23 2314   (!) 177/100 106 18 98.2 °F (36.8 °C) 97 %      MAP       --                Physical Exam    General: the patient is  awake, alert, and in no apparent distress.  Head: normocephalic and atraumatic, sclera are clear  Neck: supple without meningismus  Chest: no respiratory distress  Heart: regular rate and rhythm  ABD soft, mild epig t, nondistended, no peritoneal signs  Extremities: warm and well perfused  Skin: warm and dry  Psych conversant  Neuro: awake, alert, moving all extremities    ED Course   Procedures  Labs Reviewed   CBC W/ AUTO DIFFERENTIAL - Abnormal; Notable for the following components:       Result Value    WBC 12.77 (*)     RDW 15.4 (*)     Gran # (ANC) 7.9 (*)     All other components within normal limits   COMPREHENSIVE METABOLIC PANEL - Abnormal; Notable for the following components:    CO2 22 (*)     Glucose 118 (*)     All other components within normal limits   LIPASE   ALCOHOL,MEDICAL (ETHANOL)   DRUG SCREEN PANEL, URINE EMERGENCY    Narrative:     Specimen Source->Urine        ECG Results              EKG 12-lead (Final result)  Result time 05/02/23 10:58:04      Final result by Interface, Lab In [facility] (05/02/23 10:58:04)                   Narrative:    Test Reason : R10.10,    Vent. Rate : 099 BPM     Atrial Rate : 099 BPM     P-R Int : 138 ms          QRS Dur : 084 ms      QT Int : 356 ms       P-R-T Axes : 075 053 101 degrees     QTc Int : 456 ms    Normal sinus rhythm  Septal infarct ,age undetermined  Abnormal ECG  No previous ECGs available  Confirmed by Riana ST[address], Kyle DILLON ([id number]) on 5/2/2023 10:57:59 AM    Referred By: [email or web address]   SELF           Confirmed By:Kyle Mayers MD                                  Imaging Results              CT Abdomen Pelvis  Without Contrast (Final result)  Result time 05/02/23 00:18:06      Final result by Kelsey Stallings MD (05/02/23 00:18:06)                   Impression:      No acute finding      Electronically signed by: Kelsey Stallings  Date:    05/02/2023  Time:    00:18               Narrative:    EXAMINATION:  CT ABDOMEN PELVIS WITHOUT  CONTRAST    CLINICAL HISTORY:  Abdominal pain, acute, nonlocalized;    TECHNIQUE:  Low dose axial images, sagittal and coronal reformations were obtained from the lung bases to the pubic symphysis.  Contrast was not administered.    COMPARISON:  None    FINDINGS:  Iterative technique for diminishing radiation exposure    Liver and spleen normal size.  Gallbladder present    Pancreas unremarkable    Kidneys without hydronephrosis    Urinary bladder decompressed    No obstructive or inflammatory bowel findings    Lower lumbar spondylosis particularly L4-5                                       Medications   aluminum-magnesium hydroxide-simethicone 200-200-20 mg/5 mL suspension 30 mL (30 mLs Oral Given 5/1/23 2336)     And   LIDOcaine HCl 2% oral solution 15 mL (15 mLs Oral Given 5/1/23 2336)         Medical Decision Making:    This is an emergent evaluation of a patient presenting to the ED.  Nursing notes were reviewed.  I personally reviewed, read, and interpreted the ECG and any monitoring strips.  ECG: normal EKG, normal sinus rhythm, unchanged from previous tracings Compared with prior if available.  Read and interpreted by me independently.      I reviewed radiology images personally along with interpretations.  Imaging reviewed by me (CT scan abdomen), personally and independently, and prelims if available.  No acute/emergent pathologic findings noted on radiologic studies ordered.    I personally reviewed and interpreted the laboratory results.  I decided to obtain and review old medical records, which showed: prior admission    Evaluation for Emergency Medical Condition  The patient received a medical screening exam and within a reasonable degree of clinical confidence an emergency medical condition has not been identified.  The patient is instructed on proper follow up and return precautions to the ED.    Controlled substances:  Review of the patient's past history, chart, medication records, and medical  screening exam do not identify an emergent medical condition.  Based on the summation of all data available, I do not think it's in the patient's best interest/safety to administer or prescribe any further controlled substances.  Further prescriptions need to come from either the PCP, a specialist treating a specific condition (eg, a psychiatrist prescribing klonopin for anxiety or an orthopedic surgeon refilling narcotics for post-op pain), or a pain management specialist.      Farzad Matthews MD, JOHN PAUL                       Clinical Impression:   Final diagnoses:  [R10.10] Upper abdominal pain  [Z87.19] History of GI bleed (Primary)  [K62.5] Rectal bleed        ED Disposition Condition    Discharge Stable          ED Prescriptions       Medication Sig Dispense Start Date End Date Auth. Provider    pantoprazole (PROTONIX) 20 MG tablet Take 1 tablet (20 mg total) by mouth once daily. 30 tablet 5/2/2023 6/1/2023 Renny Matthews MD          Follow-up Information       Follow up With Specialties Details Why Contact Info Additional Information    Ervin Childress III, MD Family Medicine Schedule an appointment as soon as possible for a visit   [address] [facility]  [facility] [id number] [phone]       [facility] Gastroenterology Schedule an appointment as soon as possible for a visit   [address] [address] [phone] Please park in surface lot and check in at [address]          Discharged to [facility] in stable condition, return to ED warnings given, follow up and patient care instructions given.      Farzad Matthews MD, JOHN PAUL, FACE  Department of Emergency Medicine       Renny Matthews MD  05/02/23 [id number]       Renny Matthews MD  05/02/23 [id number]

## 2024-10-02 ENCOUNTER — TELEPHONE (OUTPATIENT)
Dept: FAMILY MEDICINE | Facility: CLINIC | Age: 59
End: 2024-10-02
Payer: MEDICARE

## 2024-10-02 NOTE — TELEPHONE ENCOUNTER
----- Message from Jenny sent at 10/2/2024 12:24 PM CDT -----  Regarding: Marilyn Calling from [employer] [phone]  Contact: Marilyn Calling from [employer] [phone]  Who Called: Marilyn Calling from [employer] [phone]    Calling in requesting to speak with you. Did not specify why. Please advise.

## 2025-08-14 ENCOUNTER — HOSPITAL ENCOUNTER (EMERGENCY)
Facility: HOSPITAL | Age: 60
Discharge: HOME | End: 2025-08-14
Attending: EMERGENCY MEDICINE
Payer: MEDICARE

## 2025-08-14 VITALS
SYSTOLIC BLOOD PRESSURE: 147 MMHG | BODY MASS INDEX: 28.23 KG/M2 | HEART RATE: 69 BPM | WEIGHT: 220 LBS | TEMPERATURE: 98 F | HEIGHT: 74 IN | OXYGEN SATURATION: 99 % | RESPIRATION RATE: 18 BRPM | DIASTOLIC BLOOD PRESSURE: 81 MMHG

## 2025-08-14 DIAGNOSIS — R10.84 GENERALIZED ABDOMINAL PAIN: Primary | ICD-10-CM

## 2025-08-14 DIAGNOSIS — R19.7 DIARRHEA, UNSPECIFIED TYPE: ICD-10-CM

## 2025-08-14 LAB
ABSOLUTE EOSINOPHIL (OHS): 0.17 K/UL
ABSOLUTE MONOCYTE (OHS): 0.81 K/UL (ref 0.3–1)
ABSOLUTE NEUTROPHIL COUNT (OHS): 4 K/UL (ref 1.8–7.7)
ALBUMIN SERPL BCP-MCNC: 4 G/DL (ref 3.5–5.2)
ALP SERPL-CCNC: 125 UNIT/L (ref 38–126)
ALT SERPL W/O P-5'-P-CCNC: 16 UNIT/L (ref 10–44)
ANION GAP (OHS): 9 MMOL/L (ref 8–16)
AST SERPL-CCNC: 23 UNIT/L (ref 15–46)
BASOPHILS # BLD AUTO: 0.04 K/UL
BASOPHILS NFR BLD AUTO: 0.5 %
BILIRUB SERPL-MCNC: 0.5 MG/DL (ref 0.1–1)
BUN SERPL-MCNC: 13 MG/DL (ref 2–20)
CALCIUM SERPL-MCNC: 8.5 MG/DL (ref 8.7–10.5)
CHLORIDE SERPL-SCNC: 103 MMOL/L (ref 95–110)
CO2 SERPL-SCNC: 26 MMOL/L (ref 23–29)
CREAT SERPL-MCNC: 0.8 MG/DL (ref 0.5–1.4)
ERYTHROCYTE [DISTWIDTH] IN BLOOD BY AUTOMATED COUNT: 13.5 % (ref 11.5–14.5)
GFR SERPLBLD CREATININE-BSD FMLA CKD-EPI: >60 ML/MIN/1.73/M2
GLUCOSE SERPL-MCNC: 114 MG/DL (ref 70–110)
HCT VFR BLD AUTO: 41.5 % (ref 40–54)
HGB BLD-MCNC: 13.9 GM/DL (ref 14–18)
IMM GRANULOCYTES # BLD AUTO: 0.02 K/UL (ref 0–0.04)
IMM GRANULOCYTES NFR BLD AUTO: 0.3 % (ref 0–0.5)
LIPASE SERPL-CCNC: <10 U/L (ref 23–300)
LYMPHOCYTES # BLD AUTO: 2.29 K/UL (ref 1–4.8)
MAGNESIUM SERPL-MCNC: 2.3 MG/DL (ref 1.6–2.6)
MCH RBC QN AUTO: 28 PG (ref 27–31)
MCHC RBC AUTO-ENTMCNC: 33.5 G/DL (ref 32–36)
MCV RBC AUTO: 84 FL (ref 82–98)
NUCLEATED RBC (/100WBC) (OHS): 0 /100 WBC
PLATELET # BLD AUTO: 235 K/UL (ref 150–450)
PMV BLD AUTO: 11.3 FL (ref 9.2–12.9)
POTASSIUM SERPL-SCNC: 4 MMOL/L (ref 3.5–5.1)
PROT SERPL-MCNC: 7.8 GM/DL (ref 6–8.4)
RBC # BLD AUTO: 4.97 M/UL (ref 4.6–6.2)
RELATIVE EOSINOPHIL (OHS): 2.3 %
RELATIVE LYMPHOCYTE (OHS): 31.2 % (ref 18–48)
RELATIVE MONOCYTE (OHS): 11.1 % (ref 4–15)
RELATIVE NEUTROPHIL (OHS): 54.6 % (ref 38–73)
SODIUM SERPL-SCNC: 138 MMOL/L (ref 136–145)
WBC # BLD AUTO: 7.33 K/UL (ref 3.9–12.7)

## 2025-08-14 PROCEDURE — 63600175 PHARM REV CODE 636 W HCPCS: Mod: ER | Performed by: EMERGENCY MEDICINE

## 2025-08-14 PROCEDURE — 85025 COMPLETE CBC W/AUTO DIFF WBC: CPT | Mod: ER | Performed by: EMERGENCY MEDICINE

## 2025-08-14 PROCEDURE — 83735 ASSAY OF MAGNESIUM: CPT | Mod: ER | Performed by: EMERGENCY MEDICINE

## 2025-08-14 PROCEDURE — 83690 ASSAY OF LIPASE: CPT | Mod: ER | Performed by: EMERGENCY MEDICINE

## 2025-08-14 PROCEDURE — 96374 THER/PROPH/DIAG INJ IV PUSH: CPT | Mod: ER

## 2025-08-14 PROCEDURE — 99284 EMERGENCY DEPT VISIT MOD MDM: CPT | Mod: 25,ER

## 2025-08-14 PROCEDURE — 25000003 PHARM REV CODE 250: Mod: ER | Performed by: EMERGENCY MEDICINE

## 2025-08-14 PROCEDURE — 84460 ALANINE AMINO (ALT) (SGPT): CPT | Mod: ER | Performed by: EMERGENCY MEDICINE

## 2025-08-14 RX ORDER — LIDOCAINE HYDROCHLORIDE 20 MG/ML
15 SOLUTION OROPHARYNGEAL ONCE
Status: COMPLETED | OUTPATIENT
Start: 2025-08-14 | End: 2025-08-14

## 2025-08-14 RX ORDER — ONDANSETRON 4 MG/1
4 TABLET, ORALLY DISINTEGRATING ORAL EVERY 6 HOURS PRN
Qty: 10 TABLET | Refills: 0 | Status: SHIPPED | OUTPATIENT
Start: 2025-08-14

## 2025-08-14 RX ORDER — ALUMINUM HYDROXIDE, MAGNESIUM HYDROXIDE, AND SIMETHICONE 1200; 120; 1200 MG/30ML; MG/30ML; MG/30ML
30 SUSPENSION ORAL ONCE
Status: COMPLETED | OUTPATIENT
Start: 2025-08-14 | End: 2025-08-14

## 2025-08-14 RX ORDER — METOCLOPRAMIDE HYDROCHLORIDE 5 MG/ML
10 INJECTION INTRAMUSCULAR; INTRAVENOUS
Status: COMPLETED | OUTPATIENT
Start: 2025-08-14 | End: 2025-08-14

## 2025-08-14 RX ORDER — AZITHROMYCIN 250 MG/1
500 TABLET, FILM COATED ORAL
Status: DISCONTINUED | OUTPATIENT
Start: 2025-08-14 | End: 2025-08-14

## 2025-08-14 RX ORDER — SUCRALFATE 1 G/10ML
1 SUSPENSION ORAL EVERY 8 HOURS PRN
Qty: 414 ML | Refills: 0 | Status: SHIPPED | OUTPATIENT
Start: 2025-08-14

## 2025-08-14 RX ADMIN — METOCLOPRAMIDE 10 MG: 5 INJECTION, SOLUTION INTRAMUSCULAR; INTRAVENOUS at 03:08

## 2025-08-14 RX ADMIN — LIDOCAINE HYDROCHLORIDE 15 ML: 20 SOLUTION ORAL at 03:08

## 2025-08-14 RX ADMIN — ALUMINUM HYDROXIDE, MAGNESIUM HYDROXIDE, AND SIMETHICONE 30 ML: 200; 200; 20 SUSPENSION ORAL at 03:08
